# Patient Record
Sex: MALE | Race: ASIAN | NOT HISPANIC OR LATINO
[De-identification: names, ages, dates, MRNs, and addresses within clinical notes are randomized per-mention and may not be internally consistent; named-entity substitution may affect disease eponyms.]

---

## 2020-06-09 ENCOUNTER — APPOINTMENT (OUTPATIENT)
Dept: ORTHOPEDIC SURGERY | Facility: CLINIC | Age: 65
End: 2020-06-09
Payer: COMMERCIAL

## 2020-06-09 VITALS
HEART RATE: 80 BPM | WEIGHT: 170 LBS | TEMPERATURE: 98.1 F | SYSTOLIC BLOOD PRESSURE: 134 MMHG | HEIGHT: 69 IN | BODY MASS INDEX: 25.18 KG/M2 | DIASTOLIC BLOOD PRESSURE: 79 MMHG

## 2020-06-09 DIAGNOSIS — Z86.79 PERSONAL HISTORY OF OTHER DISEASES OF THE CIRCULATORY SYSTEM: ICD-10-CM

## 2020-06-09 DIAGNOSIS — M25.462 EFFUSION, LEFT KNEE: ICD-10-CM

## 2020-06-09 DIAGNOSIS — M17.12 UNILATERAL PRIMARY OSTEOARTHRITIS, LEFT KNEE: ICD-10-CM

## 2020-06-09 DIAGNOSIS — Z78.9 OTHER SPECIFIED HEALTH STATUS: ICD-10-CM

## 2020-06-09 DIAGNOSIS — Z86.39 PERSONAL HISTORY OF OTHER ENDOCRINE, NUTRITIONAL AND METABOLIC DISEASE: ICD-10-CM

## 2020-06-09 DIAGNOSIS — Z72.89 OTHER PROBLEMS RELATED TO LIFESTYLE: ICD-10-CM

## 2020-06-09 DIAGNOSIS — M25.562 PAIN IN LEFT KNEE: ICD-10-CM

## 2020-06-09 PROCEDURE — 73562 X-RAY EXAM OF KNEE 3: CPT | Mod: TC,LT

## 2020-06-09 PROCEDURE — 20610 DRAIN/INJ JOINT/BURSA W/O US: CPT | Mod: LT

## 2020-06-09 PROCEDURE — 99204 OFFICE O/P NEW MOD 45 MIN: CPT | Mod: 25

## 2020-06-09 NOTE — HISTORY OF PRESENT ILLNESS
[de-identified] : Raffi is a 65-year-old male who is complaining of acute onset of left knee pain. He states that he woke up with the knee pain and swelling approximately 2-3 days ago. He had no trauma to the knee. He has no other complaints. His pain scale is 6/10.

## 2020-06-09 NOTE — PROCEDURE
[de-identified] : Laterality: Left Knee\par \par The risks and benefits of the injection were reviewed with the patient today in office and all their questions were answered.  The injection site was the lateral aspect of the knee.  Prior to giving the injection the injection site was prepped with Chloraprep and a sterile field was created.  Sterile technique was carried out throughout the procedure.  After verbal consent from the patient we went ahead and injected the left knee today with 2 ml 40 mg Depo-Medrol, 4 ml 1% lidocaine and 4 ml of .5% Bupivacaine for a total of 10 ml with a 22 ronny 1.5" needle.  The medication was placed into the suprapatellar pouch without complication.  Post injection the patient reported no pain, had a normal gait and good motion of the knee.  The patient denied numbness and tingling going down their leg.  There were no complications during the procedure.

## 2020-06-09 NOTE — DISCUSSION/SUMMARY
[de-identified] : Assessment: Left Knee Osteoarthritis/Pain\par \par Plan:\par 1. RICE Therapy.\par 2. Antiinflammatories/Tylenol as needed for pain of discomfort. \par 3. The patient was advised to rest the knee for 24-48 hours post injection.  The patient is able to resume normal activities in 24-48 hours post injection if the knee feels ok.\par 4. Continue with a home exercise/stretching program. \par 5. All the patients questions were answered.  If the patient is experiencing any problems or has concerns they were advised to call the office or make an appointment to come in to be evaluated.\par \par

## 2020-06-09 NOTE — PHYSICAL EXAM
[de-identified] : Left Knee Physical Examination:\par \par General: Alert and oriented x3.  In no acute distress.  Pleasant in nature with a normal affect.  No apparent respiratory distress. \par \par Erythema, Warmth, Rubor: Negative\par Swelling/Edema: Mild to moderate knee effusion present.\par ROM: 0-140 degrees\par Katty's Test: Negative \par Medial Joint Line TTP: Positive\par Lateral Joint Line TTP: Negative\par Lachman Exam/Anterior Drawer/Pivot Shift Test: Negative \par MCL Pain: Negative\par LCL Pain: Negative\par Iliotibial Band Pain: Negative\par Patellofemoral Joint Pain: Positive\par Patellar Tendon TTP: Negative\par Pes Anserinus TTP: Negative\par Homans Sign: Negative\par Posterior Knee Pain: Negative\par Neuro: Intact with no sensory or motor deficits\par  [de-identified] : X-rays of the left knee taken in office today and reviewed with the patient showed: Osteoarthritis medial and patellofemoral compartments.

## 2020-12-18 ENCOUNTER — APPOINTMENT (OUTPATIENT)
Dept: NEUROSURGERY | Facility: CLINIC | Age: 65
End: 2020-12-18
Payer: COMMERCIAL

## 2020-12-18 VITALS
DIASTOLIC BLOOD PRESSURE: 102 MMHG | HEART RATE: 64 BPM | OXYGEN SATURATION: 100 % | BODY MASS INDEX: 25.48 KG/M2 | WEIGHT: 172 LBS | TEMPERATURE: 98.4 F | HEIGHT: 69 IN | SYSTOLIC BLOOD PRESSURE: 174 MMHG

## 2020-12-18 PROCEDURE — 99202 OFFICE O/P NEW SF 15 MIN: CPT

## 2020-12-18 PROCEDURE — 99072 ADDL SUPL MATRL&STAF TM PHE: CPT

## 2020-12-18 NOTE — CONSULT LETTER
[Dear  ___] : Dear  [unfilled], [Courtesy Letter:] : I had the pleasure of seeing your patient, [unfilled], in my office today. [Sincerely,] : Sincerely, [DrBret  ___] : Dr. WILLS [FreeTextEntry2] : Yong Herron MD\par [address], \par [place] [id number] [FreeTextEntry1] : Raffi June is a 65-year-old male who presents today lumbar symptoms.  Patient states symptoms started approximately 3 days ago with no specific event or trauma.  Patient has a history of L2-L3 disc herniations which was treated conservatively in 2010.  Patient reports lower back stiffness with a constant shooting pain to the posterior entire right leg.  The right leg shooting pains varies in intensity rated at a 8-9/10. He reports numbness wrapping around his right ankle.  He denies any bowel or bladder dysfunction.  He reports leg weakness when in pain.  He reports some difficulties with walking when in pain.  He is not tripping over his feet. Heat and Motrin provide him with slight relief. \par \par There is no imaging to review with the patient.\par \par Patient is alert and oriented.  No distress noted.  Positive right straight leg test.  Unable to elicit bilateral Achilles tendon reflexes.  Patellar reflexes present and equal.  Negative clonus.  Strength to lower extremities and feet 5/5.  Sensation to light touch to lower extremities equal and normal.  Patient is not having any noted difficulties with walking.\par \par I provided the patient with a Medrol Dosepak.  I also provided the patient with a prescription for an LSO brace when performing heavy lifting at work.  I have advised the patient to avoid any heavy lifting at this time.  I have also referred the patient to physical therapy.  Lastly, I provided the patient with a prescription for an x-ray of the lumbar spine.  We will follow-up in 6 weeks to review imaging and to evaluate for PT progression.  Patient is aware to call at anytime with any further questions or concerns or with any new or worsening symptoms. [FreeTextEntry3] : Kimberly Vela, MSN, FNP-BC\par Nurse Practitioner\par Department of Neurosurgery\par \par [facility] [place]\par [address],\[place] [address]\par \par Office: [phone]\par Fax: [phone]\par \par

## 2020-12-30 ENCOUNTER — EMERGENCY (EMERGENCY)
Facility: HOSPITAL | Age: 65
LOS: 1 days | Discharge: HOME | End: 2020-12-30
Attending: EMERGENCY MEDICINE | Admitting: EMERGENCY MEDICINE
Payer: COMMERCIAL

## 2020-12-30 VITALS
RESPIRATION RATE: 14 BRPM | TEMPERATURE: 98 F | DIASTOLIC BLOOD PRESSURE: 89 MMHG | HEART RATE: 60 BPM | OXYGEN SATURATION: 99 % | SYSTOLIC BLOOD PRESSURE: 180 MMHG | HEIGHT: 69 IN | WEIGHT: 175.05 LBS

## 2020-12-30 VITALS
SYSTOLIC BLOOD PRESSURE: 140 MMHG | OXYGEN SATURATION: 98 % | TEMPERATURE: 98 F | DIASTOLIC BLOOD PRESSURE: 80 MMHG | RESPIRATION RATE: 18 BRPM | HEART RATE: 77 BPM

## 2020-12-30 PROCEDURE — 96374 THER/PROPH/DIAG INJ IV PUSH: CPT

## 2020-12-30 PROCEDURE — 99283 EMERGENCY DEPT VISIT LOW MDM: CPT

## 2020-12-30 PROCEDURE — 99284 EMERGENCY DEPT VISIT MOD MDM: CPT | Mod: 25

## 2020-12-30 PROCEDURE — 96375 TX/PRO/DX INJ NEW DRUG ADDON: CPT

## 2020-12-30 RX ORDER — METHOCARBAMOL 500 MG/1
1 TABLET, FILM COATED ORAL
Qty: 15 | Refills: 0
Start: 2020-12-30 | End: 2021-01-03

## 2020-12-30 RX ORDER — KETOROLAC TROMETHAMINE 30 MG/ML
15 SYRINGE (ML) INJECTION ONCE
Refills: 0 | Status: DISCONTINUED | OUTPATIENT
Start: 2020-12-30 | End: 2020-12-30

## 2020-12-30 RX ORDER — IBUPROFEN 200 MG
1 TABLET ORAL
Qty: 30 | Refills: 0
Start: 2020-12-30 | End: 2021-01-23

## 2020-12-30 RX ORDER — HYDROMORPHONE HYDROCHLORIDE 2 MG/ML
0.5 INJECTION INTRAMUSCULAR; INTRAVENOUS; SUBCUTANEOUS ONCE
Refills: 0 | Status: DISCONTINUED | OUTPATIENT
Start: 2020-12-30 | End: 2020-12-30

## 2020-12-30 RX ORDER — CYCLOBENZAPRINE HYDROCHLORIDE 10 MG/1
10 TABLET, FILM COATED ORAL ONCE
Refills: 0 | Status: COMPLETED | OUTPATIENT
Start: 2020-12-30 | End: 2020-12-30

## 2020-12-30 RX ADMIN — Medication 15 MILLIGRAM(S): at 19:37

## 2020-12-30 RX ADMIN — Medication 125 MILLIGRAM(S): at 19:40

## 2020-12-30 RX ADMIN — HYDROMORPHONE HYDROCHLORIDE 0.5 MILLIGRAM(S): 2 INJECTION INTRAMUSCULAR; INTRAVENOUS; SUBCUTANEOUS at 19:34

## 2020-12-30 RX ADMIN — HYDROMORPHONE HYDROCHLORIDE 0.5 MILLIGRAM(S): 2 INJECTION INTRAMUSCULAR; INTRAVENOUS; SUBCUTANEOUS at 19:36

## 2020-12-30 RX ADMIN — CYCLOBENZAPRINE HYDROCHLORIDE 10 MILLIGRAM(S): 10 TABLET, FILM COATED ORAL at 19:34

## 2020-12-30 NOTE — ED PROVIDER NOTE - CARE PROVIDER_API CALL
Bao Islas  ANESTHESIOLOGY  [address]  [place] [address]  Phone: [phone]  Fax: [phone]  Follow Up Time: 1-3 Days

## 2020-12-30 NOTE — ED PROVIDER NOTE - OBJECTIVE STATEMENT
64 y/o male c/o 2-3 weeks of right sciatica-type pain. Pt states pain starts in right buttock, radiates down right leg. Pt reports parastases distal path and into toes. Pt saw Neurosurgeon x 2 weeks ago, was given Steroid pack and sent for physical therapy, is awaiting MRI. Pt has been going for physical therapy, has spoken to  about possible Epidural. Pt was waiting to make appointment last night, got home from work and pain had increased after getting into bed. Pt was unable to undress or go to bathroom due to pain with movement. Today, all day, was laying on ground as all movement caused a lot of pain. Pt took Motrin 400mg, last dose 4pm. Pt states he's able to move, although it hurts. Denies weakness.

## 2020-12-30 NOTE — ED PROVIDER NOTE - PATIENT PORTAL LINK FT
You can access the FollowMyHealth Patient Portal offered by [facility] by registering at the following website: [email or web address]. By joining [facility]’s FollowMyHealth portal, you will also be able to view your health information using other applications (apps) compatible with our system.

## 2020-12-30 NOTE — ED PROVIDER NOTE - NEUROLOGICAL, MLM
Alert and oriented, no focal deficits, no motor or sensory deficits. Alert and oriented, normal strength, reports change in sensation right toes

## 2020-12-30 NOTE — ED PROVIDER NOTE - MUSCULOSKELETAL, MLM
Spine appears normal, range of motion is not limited, no muscle or joint tenderness no spinal ttp, +ttp right mid buttock, good ROM right LE with slight hesitation

## 2020-12-30 NOTE — ED PROVIDER NOTE - NSFOLLOWUPINSTRUCTIONS_ED_ALL_ED_FT
Sciatica    WHAT YOU NEED TO KNOW:    Sciatica is a condition that causes pain along your sciatic nerve. The sciatic nerve runs from your spine through both sides of your buttocks. It then runs down the back of your thigh, into your lower leg and foot. Your sciatic nerve may be compressed, inflamed, irritated, or stretched.    DISCHARGE INSTRUCTIONS:    Medicines:   •NSAIDs: These medicines decrease swelling and pain. NSAIDs are available without a doctor's order. Ask your healthcare provider which medicine is right for you. Ask how much to take and when to take it. Take as directed. NSAIDs can cause stomach bleeding or kidney problems if not taken correctly.      •Acetaminophen: This medicine decreases pain. Acetaminophen is available without a doctor's order. Ask how much to take and when to take it. Follow directions. Acetaminophen can cause liver damage if not taken correctly.      •Muscle relaxers help decrease pain and muscle spasms.      •Take your medicine as directed. Contact your healthcare provider if you think your medicine is not helping or if you have side effects. Tell him of her if you are allergic to any medicine. Keep a list of the medicines, vitamins, and herbs you take. Include the amounts, and when and why you take them. Bring the list or the pill bottles to follow-up visits. Carry your medicine list with you in case of an emergency.      Follow up with your healthcare provider as directed: Write down your questions so you remember to ask them during your visits.     Manage your symptoms:   •Activity: Decrease your activity. Do not lift heavy objects or twist your back for at least 6 weeks. Slowly return to your usual activity.      •Ice: Ice helps decrease swelling and pain. Ice may also help prevent tissue damage. Use an ice pack, or put crushed ice in a plastic bag. Cover it with a towel and place it on your low back or leg for 15 to 20 minutes every hour or as directed.      •Heat: Heat helps decrease pain and muscle spasms. Apply heat on the area for 20 to 30 minutes every 2 hours for as many days as directed.       •Physical therapy: You may need to see physical therapist to teach you exercises to help improve movement and strength, and to decrease pain. An occupational therapist teaches you skills to help with your daily activities.       •Use assistive devices if directed: You may need to wear back support, such as a back brace. You may need crutches, a cane, or a walker to decrease stress on your lower back and leg muscles. Ask your healthcare provider for more information about assistive devices and how to use them correctly.      Self-care:   •Avoid pressure on your back and legs: Do not lift heavy objects, or stand or sit for long periods of time.      •Lift objects safely: Keep your back straight and bend your knees when you  an object. Do not bend or twist your back when you lift.      •Maintain a healthy weight: Ask your healthcare provider how much you should weigh. Ask him to help you create a weight loss plan if you are overweight.       •Exercise: Ask your healthcare provider about the best stretching, warmup, and exercise plan for you.       Contact your healthcare provider if:   •You have pain in your lower back at night or when resting.      •You have pain in your lower back with numbness below the knee.      •You have weakness in one leg only.      •You have questions or concerns about your condition or care.      Return to the emergency department if:   •You have trouble holding back your urine or bowel movements.      •You have weakness in both legs.      •You have numbness in your groin or buttocks.

## 2020-12-31 ENCOUNTER — NON-APPOINTMENT (OUTPATIENT)
Age: 65
End: 2020-12-31

## 2021-01-11 ENCOUNTER — NON-APPOINTMENT (OUTPATIENT)
Age: 66
End: 2021-01-11

## 2021-01-28 ENCOUNTER — APPOINTMENT (OUTPATIENT)
Dept: NEUROSURGERY | Facility: CLINIC | Age: 66
End: 2021-01-28
Payer: COMMERCIAL

## 2021-01-28 VITALS — BODY MASS INDEX: 25.09 KG/M2 | OXYGEN SATURATION: 99 % | WEIGHT: 169.38 LBS | HEIGHT: 69 IN

## 2021-01-28 DIAGNOSIS — M79.604 PAIN IN RIGHT LEG: ICD-10-CM

## 2021-01-28 DIAGNOSIS — R20.2 PARESTHESIA OF SKIN: ICD-10-CM

## 2021-01-28 PROBLEM — I10 ESSENTIAL (PRIMARY) HYPERTENSION: Chronic | Status: ACTIVE | Noted: 2020-12-30

## 2021-01-28 PROCEDURE — 99215 OFFICE O/P EST HI 40 MIN: CPT

## 2021-01-28 PROCEDURE — 99072 ADDL SUPL MATRL&STAF TM PHE: CPT

## 2021-01-28 NOTE — CONSULT LETTER
[Dear  ___] : Dear  [unfilled], [Courtesy Letter:] : I had the pleasure of seeing your patient, [unfilled], in my office today. [Sincerely,] : Sincerely, [FreeTextEntry2] : Yong Herron MD\par [address], \[place] [address] \par \par  [FreeTextEntry1] : Mr. June is a very pleasant 65-year-old male patient who was seen in our office today in follow-up after his MRI scan.\par \par Briefly, the patient had a severe episode of right-sided leg pain occurring mid December 2020 without trauma.  The patient attempted to manage his symptoms conservatively, but had a severe exacerbation of his pain requiring  presentation to the hospital on December 30, 2020.  At the time, the patient's pain was rated at a 12/10 in severity.  Thankfully, the patient's pain has resolved significantly and is currently rated at a 3/10 in severity. The patient, however, still has significant numbness in the right lower extremity which is his major concern today.\par \par On examination, the patient is alert, oriented, and compliant with the exam. The patient demonstrates 5/5 strength in the lower extremities bilaterally and 2+ reflexes at the patellar tendons.  The patient ambulates with a very mild antalgic gait.\par \par The patient is accompanied with an MRI scan of the lumbar spine dated January 26, 2021.  Unfortunately, there are several discrepancies in the radiology reports which may cause confusion.  Firstly, the patient has transitional lumbosacral junction anatomy noted on an x-ray performed on December 23, 2020.  Hence, the first fully formed disc is actually S1/2 and not L5/S1.  Using this nomenclature, the patient has a disc herniation at L5/S1 on the right.   in addition, my interpretation of his imaging would also include a small synovial cyst at L5/S1 on the right.  This, however, was not mentioned by the radiologist.  Moreover, the radiology report incorrectly labels this disc at L4/5.  In addition, the radiology report notes a "left lateral disc herniation at L3-4 contacting the left L3 nerve root" which would correspond to a far lateral disc at L4/5 with our current nomenclature.  However, I do not appreciate this finding at all on the images.  The patient does have a far lateral disc at L4/5 on the right which is downwardly displaced and thus would not be contacting the L4 nerve root.\par \par Taken together, the patient has a clinical history and radiographic findings most consistent with a resolving disc herniation and lumbar radiculopathy.  Again, the patient has a lumbarized sacrum meaning that the disc herniation and possible synovial cyst by this convention would be located at L5/S1 on the right.  I will reach out to the radiologist to discuss this case and have them amend their report if necessary.  Regardless of the nomenclature at this time, the patient would like to continue with conservative therapy for the next several weeks to see if his disc herniation/synovial cyst and radiculopathy will resolve without surgical intervention.  We did take some time to discuss the possibility of a microdiscectomy and resection of synovial cyst should his symptoms not subside spontaneously.  At this time, the patient has scheduled follow-up with us in approximately 6 weeks and we look forward to seeing him back then or earlier should the need arise. [FreeTextEntry3] : Anderson Wilson MD, PhD, FRCPSC \par Attending Neurosurgeon \par [facility] \par [address] \[place] [address] \par Office: [phone] \par Fax: [phone]\par \par

## 2021-03-11 ENCOUNTER — APPOINTMENT (OUTPATIENT)
Dept: NEUROSURGERY | Facility: CLINIC | Age: 66
End: 2021-03-11
Payer: COMMERCIAL

## 2021-03-11 VITALS
HEART RATE: 73 BPM | HEIGHT: 69 IN | OXYGEN SATURATION: 100 % | WEIGHT: 170 LBS | BODY MASS INDEX: 25.18 KG/M2 | TEMPERATURE: 97.8 F | SYSTOLIC BLOOD PRESSURE: 158 MMHG | DIASTOLIC BLOOD PRESSURE: 91 MMHG

## 2021-03-11 PROCEDURE — 99072 ADDL SUPL MATRL&STAF TM PHE: CPT

## 2021-03-11 PROCEDURE — 99214 OFFICE O/P EST MOD 30 MIN: CPT

## 2021-03-11 NOTE — CONSULT LETTER
[Dear  ___] : Dear  [unfilled], [Courtesy Letter:] : I had the pleasure of seeing your patient, [unfilled], in my office today. [Sincerely,] : Sincerely, [FreeTextEntry2] : Yong Herron MD\par [address], \par [place] [id number]  [FreeTextEntry1] : Mr. June is a very pleasant 66-year-old male patient who was seen in our office today in regards to right-sided leg symptoms.\par \par As you may recall, the patient had a severe onset of leg pain in the middle of December 2020 culminating in presentation to the hospital for pain management.  The patient subsequently underwent advanced imaging and was noted to have an L4/5 disc herniation on the right with nerve root compression.  Currently, the patient continues to have numbness on the lateral aspect of his  right foot which is his major concern.  The patient states that he occasionally has pain in the right leg but he has not required any pain medications for the last month and a half.   although the patient's pain is significantly improved since the onset, the patient believes that his numbness has remained stable.  The patient denies any weakness, saddle anesthesia, or new bowel/bladder symptoms.  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  The patient demonstrates 5/5 strength in the lower extremities bilaterally with hip flexion, knee extension, ankle dorsiflexion, ankle plantar flexion, and extension of the hallucis longus.  The patient ambulates well.  The patient does have numbness on the  lateral aspect of the right foot and calf.\par \par  I have no new imaging to review today.  The patient's previous imaging from January 26, 2021 demonstrates a right-sided L4/5 disc herniation with the possibility of a synovial cyst as well.\par \par Taken together, the patient has a clinical history of radiographic findings most consistent with a resolving lumbar radiculopathy secondary to a disc herniation.  At this time, I explained to the patient that he would be a surgical candidate for a microdiscectomy and synovial cyst resection on the right at L4/5 should conservative therapies fail.  However, the patient is currently doing well clinically and thus we both agreed that surgery is premature.  I am hopeful that the patient's symptoms will resolve completely with time, however our office remains open to the patient at any time should he need our help.  The patient will be following up with us on an as-needed basis at his request. [FreeTextEntry3] : Anderson Wilson MD, PhD, FRCSC, FAANS\par Attending Neurosurgeon\par  of Neurosurgery\par [facility]\par [facility] [place]\par [address],\[place] [address]\par Office: [phone]\par Fax: [phone]\par \par  \par \par \par

## 2022-01-01 ENCOUNTER — NON-APPOINTMENT (OUTPATIENT)
Age: 67
End: 2022-01-01

## 2022-01-01 DIAGNOSIS — R39.9 UNSPECIFIED SYMPTOMS AND SIGNS INVOLVING THE GENITOURINARY SYSTEM: ICD-10-CM

## 2022-01-04 ENCOUNTER — APPOINTMENT (OUTPATIENT)
Dept: ULTRASOUND IMAGING | Facility: CLINIC | Age: 67
End: 2022-01-04
Payer: COMMERCIAL

## 2022-01-04 ENCOUNTER — OUTPATIENT (OUTPATIENT)
Dept: OUTPATIENT SERVICES | Facility: HOSPITAL | Age: 67
LOS: 1 days | End: 2022-01-04
Payer: COMMERCIAL

## 2022-01-04 DIAGNOSIS — R39.9 UNSPECIFIED SYMPTOMS AND SIGNS INVOLVING THE GENITOURINARY SYSTEM: ICD-10-CM

## 2022-01-04 LAB
APPEARANCE: CLEAR
BACTERIA UR CULT: NORMAL
BACTERIA: NEGATIVE
BILIRUBIN URINE: NEGATIVE
BLOOD URINE: NEGATIVE
COLOR: YELLOW
GLUCOSE QUALITATIVE U: NEGATIVE
HYALINE CASTS: 0 /LPF
KETONES URINE: NEGATIVE
LEUKOCYTE ESTERASE URINE: NEGATIVE
MICROSCOPIC-UA: NORMAL
NITRITE URINE: NEGATIVE
PH URINE: 6
PROTEIN URINE: NORMAL
RED BLOOD CELLS URINE: 1 /HPF
SPECIFIC GRAVITY URINE: 1.02
SQUAMOUS EPITHELIAL CELLS: 0 /HPF
UROBILINOGEN URINE: NORMAL
WHITE BLOOD CELLS URINE: 1 /HPF

## 2022-01-04 PROCEDURE — 76770 US EXAM ABDO BACK WALL COMP: CPT

## 2022-01-04 PROCEDURE — 76770 US EXAM ABDO BACK WALL COMP: CPT | Mod: 26

## 2022-03-08 ENCOUNTER — APPOINTMENT (OUTPATIENT)
Dept: NEUROSURGERY | Facility: CLINIC | Age: 67
End: 2022-03-08
Payer: COMMERCIAL

## 2022-03-08 VITALS
HEART RATE: 76 BPM | SYSTOLIC BLOOD PRESSURE: 173 MMHG | WEIGHT: 178 LBS | BODY MASS INDEX: 26.36 KG/M2 | DIASTOLIC BLOOD PRESSURE: 90 MMHG | HEIGHT: 69 IN | OXYGEN SATURATION: 99 % | TEMPERATURE: 97.8 F

## 2022-03-08 PROCEDURE — 99214 OFFICE O/P EST MOD 30 MIN: CPT

## 2022-03-08 NOTE — CONSULT LETTER
[Dear  ___] : Dear  [unfilled], [Courtesy Letter:] : I had the pleasure of seeing your patient, [unfilled], in my office today. [Sincerely,] : Sincerely, [FreeTextEntry2] : Yong Herron MD\par [address], \[place] [address] \par  [FreeTextEntry1] : Mr. June is a very pleasant 67-year-old male patient who was seen in our office today in follow-up.  The patient previously had severe right-sided pain secondary to an L4/5 disc herniation and simultaneous synovial cyst.\par \par I am happy to report that the patient is currently doing well following conservative therapy alone.  At this time, the patient complains primarily of  back stiffness and numbness in the right foot from the calf down.  The patient does not complain of pain at this time.\par \par On examination, the patient is alert, oriented, and compliant with the exam.  The patient demonstrates 5/5 strength in the lower extremities bilaterally with hip flexion, knee extension, ankle dorsiflexion, ankle plantar flexion and extension of the hallucis longus.  The patient ambulates well.  The patient continues to have numbness in the lateral aspect of the right foot and the calf.\par \par I have no new imaging to review today.\par \par Taken together, the patient has done well with conservative therapy but continues to have neurologic deficits including numbness in the right foot.  At this time, I have recommended an updated MRI scan to reevaluate the state of the disc herniation and synovial cyst.  With regards to his low back pain, the patient most likely has a component of muscle disuse in the lower back for which I recommended physical therapy.  I will be in contact with the patient with the results of his MRI scan once they become available to provide a more specific treatment plan going forward. [FreeTextEntry3] : Anderson Wilson MD, PhD, FRCPSC \par Attending Neurosurgeon \par [facility] \par [address] \[place] [address] \par Office: [phone] \par Fax: [phone]\par \par

## 2022-03-24 ENCOUNTER — NON-APPOINTMENT (OUTPATIENT)
Age: 67
End: 2022-03-24

## 2022-04-12 ENCOUNTER — APPOINTMENT (OUTPATIENT)
Dept: SURGERY | Facility: CLINIC | Age: 67
End: 2022-04-12

## 2022-06-07 ENCOUNTER — NON-APPOINTMENT (OUTPATIENT)
Age: 67
End: 2022-06-07

## 2022-06-07 ENCOUNTER — APPOINTMENT (OUTPATIENT)
Dept: SURGERY | Facility: CLINIC | Age: 67
End: 2022-06-07
Payer: MEDICARE

## 2022-06-07 VITALS
HEART RATE: 78 BPM | SYSTOLIC BLOOD PRESSURE: 191 MMHG | TEMPERATURE: 97.3 F | WEIGHT: 180 LBS | HEIGHT: 69 IN | OXYGEN SATURATION: 99 % | BODY MASS INDEX: 26.66 KG/M2 | DIASTOLIC BLOOD PRESSURE: 91 MMHG

## 2022-06-07 DIAGNOSIS — Z84.1 FAMILY HISTORY OF DISORDERS OF KIDNEY AND URETER: ICD-10-CM

## 2022-06-07 PROCEDURE — 99204 OFFICE O/P NEW MOD 45 MIN: CPT

## 2022-06-07 NOTE — PHYSICAL EXAM
[JVD] : no jugular venous distention  [Normal Thyroid] : the thyroid was normal [Carotid Bruits] : no carotid bruits [Normal Breath Sounds] : Normal breath sounds [Normal Heart Sounds] : normal heart sounds [Normal Rate and Rhythm] : normal rate and rhythm [No Rash or Lesion] : No rash or lesion [Alert] : alert [Oriented to Person] : oriented to person [Oriented to Place] : oriented to place [Oriented to Time] : oriented to time [Calm] : calm [de-identified] : well developed male in no distress [de-identified] : noninjected and nonicteric [de-identified] : without adenopathy [de-identified] : normal bowel sounds, without distension or tenderness [de-identified] : normal testicles, without masses. Bilateral reducible inguinal hernias [de-identified] : done by LUCIANO [de-identified] : without calf pain or swelling

## 2022-06-07 NOTE — HISTORY OF PRESENT ILLNESS
[de-identified] : bilateral inguinal hernias [de-identified] : 67 year old male who presents with a long history of inguinal hernias that have been increasing in size and are now causing him discomfort. He denies any nausea or vomiting or changes in his bowel habits. He does have swelling in both groins. He denies urinary symptoms.

## 2022-06-07 NOTE — ASSESSMENT
[FreeTextEntry1] : I have discussed the signs and symptoms of incarceration and strangulation with pt and the importance of calling immediately should they develop.\par I have also discussed the risks, benefits and surgical options with the patient. He wants to schedule a laparoscopic repair of his bilateral inguinal hernias.

## 2022-08-01 ENCOUNTER — NON-APPOINTMENT (OUTPATIENT)
Age: 67
End: 2022-08-01

## 2022-08-01 ENCOUNTER — APPOINTMENT (OUTPATIENT)
Dept: INTERNAL MEDICINE | Facility: CLINIC | Age: 67
End: 2022-08-01

## 2022-08-01 VITALS
HEIGHT: 69 IN | SYSTOLIC BLOOD PRESSURE: 152 MMHG | BODY MASS INDEX: 26.22 KG/M2 | WEIGHT: 177 LBS | TEMPERATURE: 97.9 F | RESPIRATION RATE: 16 BRPM | DIASTOLIC BLOOD PRESSURE: 86 MMHG | HEART RATE: 77 BPM | OXYGEN SATURATION: 95 %

## 2022-08-01 VITALS — DIASTOLIC BLOOD PRESSURE: 90 MMHG | SYSTOLIC BLOOD PRESSURE: 146 MMHG

## 2022-08-01 DIAGNOSIS — F17.200 NICOTINE DEPENDENCE, UNSPECIFIED, UNCOMPLICATED: ICD-10-CM

## 2022-08-01 DIAGNOSIS — Z82.49 FAMILY HISTORY OF ISCHEMIC HEART DISEASE AND OTHER DISEASES OF THE CIRCULATORY SYSTEM: ICD-10-CM

## 2022-08-01 PROCEDURE — G0402 INITIAL PREVENTIVE EXAM: CPT

## 2022-08-01 PROCEDURE — G0403: CPT

## 2022-08-01 RX ORDER — VALSARTAN 40 MG/1
TABLET, COATED ORAL
Refills: 0 | Status: DISCONTINUED | COMMUNITY
End: 2022-08-01

## 2022-08-01 RX ORDER — MELOXICAM 15 MG/1
15 TABLET ORAL DAILY
Qty: 30 | Refills: 1 | Status: DISCONTINUED | COMMUNITY
Start: 2020-06-09 | End: 2022-08-01

## 2022-08-01 RX ORDER — METHYLPREDNISOLONE 4 MG/1
4 TABLET ORAL
Qty: 1 | Refills: 0 | Status: DISCONTINUED | COMMUNITY
Start: 2020-12-18 | End: 2022-08-01

## 2022-08-01 RX ORDER — GABAPENTIN 100 MG/1
100 CAPSULE ORAL 3 TIMES DAILY
Qty: 30 | Refills: 2 | Status: DISCONTINUED | COMMUNITY
Start: 2020-12-31 | End: 2022-08-01

## 2022-08-01 NOTE — END OF VISIT
[FreeTextEntry3] : "I, Celine Hu, personally scribed the services dictated to me by Dr. Willi Marshall MD in this documentation on 08/01/2022 " \par \par "I Dr. Willi Marshall MD, personally performed the services described in this documentation on 08/01/2022 for the patient as scribed by Celine Hu in my presence. I have reviewed and verified that all the information is accurate and true."

## 2022-08-01 NOTE — HISTORY OF PRESENT ILLNESS
[FreeTextEntry1] : new patient, establishing care  [de-identified] : JORDAN OLIVERA is a 67 year old M who presents today for new patient, establishing care. Patient has a Hx of high blood pressure and high cholesterol. He reports having herniated discs with numbness associated in feet. He suspects a bilateral hernia. Patient also states it has been a couple years since his last colonoscopy.

## 2022-08-01 NOTE — HEALTH RISK ASSESSMENT
[Good] : ~his/her~  mood as  good [Never] : Never [No] : In the past 12 months have you used drugs other than those required for medical reasons? No [No falls in past year] : Patient reported no falls in the past year [0] : 2) Feeling down, depressed, or hopeless: Not at all (0) [PHQ-2 Negative - No further assessment needed] : PHQ-2 Negative - No further assessment needed [[id number]] : 0

## 2022-08-01 NOTE — PLAN
[FreeTextEntry1] : further instructions pending lab results \par adjust blood pressure medication add Amlodipine \par follow up with surgeon\par obtain old records\par recommend Colonoscopy \par return 2 weeks

## 2022-08-01 NOTE — PHYSICAL EXAM
[No Acute Distress] : no acute distress [Well Nourished] : well nourished [Well Developed] : well developed [Well-Appearing] : well-appearing [Normal Sclera/Conjunctiva] : normal sclera/conjunctiva [PERRL] : pupils equal round and reactive to light [EOMI] : extraocular movements intact [Normal Outer Ear/Nose] : the outer ears and nose were normal in appearance [Normal Oropharynx] : the oropharynx was normal [Normal TMs] : both tympanic membranes were normal [No JVD] : no jugular venous distention [No Lymphadenopathy] : no lymphadenopathy [Supple] : supple [Thyroid Normal, No Nodules] : the thyroid was normal and there were no nodules present [No Respiratory Distress] : no respiratory distress  [No Accessory Muscle Use] : no accessory muscle use [Clear to Auscultation] : lungs were clear to auscultation bilaterally [Normal Rate] : normal rate  [Regular Rhythm] : with a regular rhythm [Normal S1, S2] : normal S1 and S2 [No Murmur] : no murmur heard [No Carotid Bruits] : no carotid bruits [No Abdominal Bruit] : a ~M bruit was not heard ~T in the abdomen [No Varicosities] : no varicosities [Pedal Pulses Present] : the pedal pulses are present [No Edema] : there was no peripheral edema [No Palpable Aorta] : no palpable aorta [No Extremity Clubbing/Cyanosis] : no extremity clubbing/cyanosis [Soft] : abdomen soft [Non Tender] : non-tender [Non-distended] : non-distended [No Masses] : no abdominal mass palpated [No HSM] : no HSM [Normal Bowel Sounds] : normal bowel sounds [Normal Supraclavicular Nodes] : no supraclavicular lymphadenopathy [Normal Posterior Cervical Nodes] : no posterior cervical lymphadenopathy [Normal Anterior Cervical Nodes] : no anterior cervical lymphadenopathy [No CVA Tenderness] : no CVA  tenderness [No Spinal Tenderness] : no spinal tenderness [No Joint Swelling] : no joint swelling [Grossly Normal Strength/Tone] : grossly normal strength/tone [No Rash] : no rash [Coordination Grossly Intact] : coordination grossly intact [No Focal Deficits] : no focal deficits [Normal Gait] : normal gait [Deep Tendon Reflexes (DTR)] : deep tendon reflexes were 2+ and symmetric [Speech Grossly Normal] : speech grossly normal [Memory Grossly Normal] : memory grossly normal [Normal Affect] : the affect was normal [Alert and Oriented x3] : oriented to person, place, and time [Normal Mood] : the mood was normal [Normal Insight/Judgement] : insight and judgment were intact [Normal Nasal Mucosa] : the nasal mucosa was normal [Normal Appearance] : normal in appearance [Inguinal Hernia Right] : a right inguinal hernia was present [Inguinal Hernia Left] : a left inguinal hernia was present [] : which was reducible [Normal Sphincter Tone] : normal sphincter tone [No Mass] : no mass [Normal Axillary Nodes] : no axillary lymphadenopathy [Normal Inguinal Nodes] : no inguinal lymphadenopathy [Normal Femoral Nodes] : no femoral lymphadenopathy

## 2022-08-02 DIAGNOSIS — E55.9 VITAMIN D DEFICIENCY, UNSPECIFIED: ICD-10-CM

## 2022-08-02 LAB
25(OH)D3 SERPL-MCNC: 24.7 NG/ML
ALBUMIN SERPL ELPH-MCNC: 4.7 G/DL
ALP BLD-CCNC: 93 U/L
ALT SERPL-CCNC: 22 U/L
ANION GAP SERPL CALC-SCNC: 13 MMOL/L
APPEARANCE: CLEAR
AST SERPL-CCNC: 25 U/L
BACTERIA: NEGATIVE
BASOPHILS # BLD AUTO: 0.04 K/UL
BASOPHILS NFR BLD AUTO: 1 %
BILIRUB SERPL-MCNC: 0.5 MG/DL
BILIRUBIN URINE: NEGATIVE
BLOOD URINE: NEGATIVE
BUN SERPL-MCNC: 20 MG/DL
CALCIUM SERPL-MCNC: 9.9 MG/DL
CHLORIDE SERPL-SCNC: 102 MMOL/L
CHOLEST SERPL-MCNC: 174 MG/DL
CK SERPL-CCNC: 124 U/L
CO2 SERPL-SCNC: 28 MMOL/L
COLOR: NORMAL
CREAT SERPL-MCNC: 1.07 MG/DL
EGFR: 77 ML/MIN/1.73M2
EOSINOPHIL # BLD AUTO: 0.1 K/UL
EOSINOPHIL NFR BLD AUTO: 2.5 %
ESTIMATED AVERAGE GLUCOSE: 108 MG/DL
GLUCOSE QUALITATIVE U: NEGATIVE
GLUCOSE SERPL-MCNC: 100 MG/DL
HBA1C MFR BLD HPLC: 5.4 %
HCT VFR BLD CALC: 41.3 %
HDLC SERPL-MCNC: 60 MG/DL
HEMOCCULT STL QL IA: NEGATIVE
HGB BLD-MCNC: 13.7 G/DL
HYALINE CASTS: 0 /LPF
IMM GRANULOCYTES NFR BLD AUTO: 0.2 %
KETONES URINE: NEGATIVE
LDLC SERPL CALC-MCNC: 102 MG/DL
LEUKOCYTE ESTERASE URINE: NEGATIVE
LYMPHOCYTES # BLD AUTO: 1.4 K/UL
LYMPHOCYTES NFR BLD AUTO: 34.9 %
MAN DIFF?: NORMAL
MCHC RBC-ENTMCNC: 30.1 PG
MCHC RBC-ENTMCNC: 33.2 GM/DL
MCV RBC AUTO: 90.8 FL
MICROSCOPIC-UA: NORMAL
MONOCYTES # BLD AUTO: 0.42 K/UL
MONOCYTES NFR BLD AUTO: 10.5 %
NEUTROPHILS # BLD AUTO: 2.04 K/UL
NEUTROPHILS NFR BLD AUTO: 50.9 %
NITRITE URINE: NEGATIVE
NONHDLC SERPL-MCNC: 114 MG/DL
PH URINE: 6.5
PLATELET # BLD AUTO: 213 K/UL
POTASSIUM SERPL-SCNC: 4.3 MMOL/L
PROT SERPL-MCNC: 7.3 G/DL
PROTEIN URINE: NEGATIVE
PSA SERPL-MCNC: 9.16 NG/ML
RBC # BLD: 4.55 M/UL
RBC # FLD: 13.7 %
RED BLOOD CELLS URINE: 1 /HPF
SODIUM SERPL-SCNC: 143 MMOL/L
SPECIFIC GRAVITY URINE: 1.02
SQUAMOUS EPITHELIAL CELLS: 0 /HPF
TRIGL SERPL-MCNC: 59 MG/DL
TSH SERPL-ACNC: 2.72 UIU/ML
UROBILINOGEN URINE: NORMAL
WBC # FLD AUTO: 4.01 K/UL
WHITE BLOOD CELLS URINE: 1 /HPF

## 2022-08-03 ENCOUNTER — NON-APPOINTMENT (OUTPATIENT)
Age: 67
End: 2022-08-03

## 2022-08-11 ENCOUNTER — TRANSCRIPTION ENCOUNTER (OUTPATIENT)
Age: 67
End: 2022-08-11

## 2022-08-11 ENCOUNTER — NON-APPOINTMENT (OUTPATIENT)
Age: 67
End: 2022-08-11

## 2022-08-12 ENCOUNTER — NON-APPOINTMENT (OUTPATIENT)
Age: 67
End: 2022-08-12

## 2022-08-12 LAB
PSA FREE FLD-MCNC: 8 %
PSA FREE SERPL-MCNC: 0.4 NG/ML
PSA SERPL-MCNC: 4.94 NG/ML

## 2022-08-17 ENCOUNTER — APPOINTMENT (OUTPATIENT)
Dept: UROLOGY | Facility: CLINIC | Age: 67
End: 2022-08-17

## 2022-08-17 VITALS
HEART RATE: 72 BPM | HEIGHT: 69 IN | SYSTOLIC BLOOD PRESSURE: 165 MMHG | DIASTOLIC BLOOD PRESSURE: 93 MMHG | WEIGHT: 177 LBS | OXYGEN SATURATION: 98 % | BODY MASS INDEX: 26.22 KG/M2

## 2022-08-17 DIAGNOSIS — R97.20 ELEVATED PROSTATE, SPECIFIC ANTIGEN [PSA]: ICD-10-CM

## 2022-08-17 PROCEDURE — 99204 OFFICE O/P NEW MOD 45 MIN: CPT | Mod: 25

## 2022-08-17 PROCEDURE — 51798 US URINE CAPACITY MEASURE: CPT

## 2022-08-17 NOTE — HISTORY OF PRESENT ILLNESS
[FreeTextEntry1] : 67 year old male presents for Elevated PSA. \par Recently had PSA checked and was told is elevated. Spoke with Patient on phone and repeated PSA. \par Denies any recent unintentional weight loss, night sweats and new bone pain. Has chronic back pain.\par No family history of Prostate cancer. \par Reports reasonable stream, urinates every 3-4 hours or so during the day. Nocturia of 1 x. \par Endorses off and on dysuria, urgency, sense of incomplete emptying and post void dribbling. \par Denies hematuria, lower abdominal or flank pain, fever, chills or rigors.\par Rates erections 3-4/5. Able to attain, maintain and penetrate. \par Normal libido and ejaculation. \par \par Has complained of numbness, pinched feeling in pelvic area and some dysuria in January 2022. \par Had obtained urine test and Renal and Bladder Ultrasound: negative. \par \par

## 2022-08-17 NOTE — ASSESSMENT
[FreeTextEntry1] : Per Patient PSA: 0.92(Jan 2022). \par \par Elevated PSA:\par Discussed with the patient that PSA is a substance produced by the prostate gland. Elevated PSA levels may indicate a noncancerous condition such as prostatitis, or an enlarged prostate but it may also indicate prostate cancer.\par Discussed PSA screening and latest recommendations/guidelines- USPTF and AUA. \par Explained that only way to rule out Prostate cancer in a patient with elevated PSA, increased PSA velocity or abnormal digital rectal exam is to do Prostate needle biopsy.\par Total and Free PSA 1 week before next appointment.\par \par Benign Prostatic Hyperplasia:\par PVR: 0 ml. \par Will get Urinalysis and Urine culture.\par Discussed treatment options. Recommended Flomax. Patient agreeable. \par Prescribed Flomax. Explained common side effects: nasal congestion, cough, muscle pain, back pain, dizziness, orthostatic hypotension, somnolence and retrograde ejaculation. \par \par Return to office in 2 months or sooner if any issues: will do Uroflo/PVR.

## 2022-08-17 NOTE — PHYSICAL EXAM
[Urethral Meatus] : meatus normal [Penis Abnormality] : normal uncircumcised penis [Epididymis] : the epididymides were normal [Testes Tenderness] : no tenderness of the testes [Testes Mass (___cm)] : there were no testicular masses [Prostate Tenderness] : the prostate was not tender [No Prostate Nodules] : no prostate nodules [Prostate Size ___ (0-4)] : prostate size [unfilled] (scale: 0-4) [Normal Appearance] : normal appearance [General Appearance - In No Acute Distress] : no acute distress [FreeTextEntry1] : normal peripheral circulation  [] : no respiratory distress [Abdomen Soft] : soft [Abdomen Tenderness] : non-tender [Normal Station and Gait] : the gait and station were normal for the patient's age [Skin Color & Pigmentation] : normal skin color and pigmentation [No Focal Deficits] : no focal deficits [Oriented To Time, Place, And Person] : oriented to person, place, and time [No Palpable Adenopathy] : no palpable adenopathy

## 2022-08-18 LAB
APPEARANCE: CLEAR
BACTERIA: NEGATIVE
BILIRUBIN URINE: NEGATIVE
BLOOD URINE: NEGATIVE
COLOR: NORMAL
GLUCOSE QUALITATIVE U: NEGATIVE
HYALINE CASTS: 0 /LPF
KETONES URINE: NEGATIVE
LEUKOCYTE ESTERASE URINE: NEGATIVE
MICROSCOPIC-UA: NORMAL
NITRITE URINE: NEGATIVE
PH URINE: 6.5
PROTEIN URINE: NORMAL
RED BLOOD CELLS URINE: 0 /HPF
SPECIFIC GRAVITY URINE: 1.02
SQUAMOUS EPITHELIAL CELLS: 0 /HPF
UROBILINOGEN URINE: NORMAL
WHITE BLOOD CELLS URINE: 0 /HPF

## 2022-08-19 LAB — BACTERIA UR CULT: NORMAL

## 2022-08-30 ENCOUNTER — APPOINTMENT (OUTPATIENT)
Dept: INTERNAL MEDICINE | Facility: CLINIC | Age: 67
End: 2022-08-30

## 2022-08-30 VITALS
WEIGHT: 176 LBS | DIASTOLIC BLOOD PRESSURE: 86 MMHG | BODY MASS INDEX: 26.07 KG/M2 | RESPIRATION RATE: 16 BRPM | TEMPERATURE: 98.1 F | HEIGHT: 69 IN | SYSTOLIC BLOOD PRESSURE: 132 MMHG | HEART RATE: 71 BPM | OXYGEN SATURATION: 97 %

## 2022-08-30 VITALS — DIASTOLIC BLOOD PRESSURE: 80 MMHG | SYSTOLIC BLOOD PRESSURE: 134 MMHG

## 2022-08-30 PROCEDURE — 99214 OFFICE O/P EST MOD 30 MIN: CPT

## 2022-08-30 NOTE — HISTORY OF PRESENT ILLNESS
[FreeTextEntry1] : follow up  [de-identified] : JORDAN JOSELIN is a 67 year old M who presents today for follow up for blood pressure and cholesterol\par tolerating medications

## 2022-08-30 NOTE — END OF VISIT
[FreeTextEntry3] : "I, Marylou Browne, personally scribed the services dictated to me by Dr. Willi Marshall MD in this documentation on 08/30/2022 " \par \par "I Dr. Willi Marshall MD, personally performed the services described in this documentation on 08/30/2022 for the patient as scribed by Marylou Browne in my presence. I have reviewed and verified that all the information is accurate and true."

## 2022-08-30 NOTE — HEALTH RISK ASSESSMENT
[Never] : Never [Yes] : Yes [2 - 3 times a week (3 pts)] : 2 - 3  times a week (3 points) [1 or 2 (0 pts)] : 1 or 2 (0 points) [Never (0 pts)] : Never (0 points) [No] : In the past 12 months have you used drugs other than those required for medical reasons? No [No falls in past year] : Patient reported no falls in the past year [0] : 2) Feeling down, depressed, or hopeless: Not at all (0) [PHQ-2 Negative - No further assessment needed] : PHQ-2 Negative - No further assessment needed [[id number]] : 0

## 2022-10-07 LAB
PSA FREE FLD-MCNC: 18 %
PSA FREE SERPL-MCNC: 0.22 NG/ML
PSA SERPL-MCNC: 1.2 NG/ML

## 2022-11-30 ENCOUNTER — RX RENEWAL (OUTPATIENT)
Age: 67
End: 2022-11-30

## 2022-12-08 ENCOUNTER — APPOINTMENT (OUTPATIENT)
Dept: NEUROSURGERY | Facility: CLINIC | Age: 67
End: 2022-12-08

## 2022-12-08 VITALS
HEART RATE: 74 BPM | WEIGHT: 176.2 LBS | SYSTOLIC BLOOD PRESSURE: 174 MMHG | OXYGEN SATURATION: 100 % | DIASTOLIC BLOOD PRESSURE: 91 MMHG | HEIGHT: 69 IN | TEMPERATURE: 97.1 F | BODY MASS INDEX: 26.1 KG/M2

## 2022-12-08 DIAGNOSIS — M54.9 DORSALGIA, UNSPECIFIED: ICD-10-CM

## 2022-12-08 DIAGNOSIS — G89.29 DORSALGIA, UNSPECIFIED: ICD-10-CM

## 2022-12-08 PROCEDURE — 99215 OFFICE O/P EST HI 40 MIN: CPT

## 2022-12-23 ENCOUNTER — APPOINTMENT (OUTPATIENT)
Dept: NEUROLOGY | Facility: CLINIC | Age: 67
End: 2022-12-23

## 2022-12-23 PROCEDURE — 99203 OFFICE O/P NEW LOW 30 MIN: CPT

## 2022-12-23 PROCEDURE — 95910 NRV CNDJ TEST 7-8 STUDIES: CPT

## 2022-12-23 PROCEDURE — 95886 MUSC TEST DONE W/N TEST COMP: CPT

## 2022-12-23 NOTE — DATA REVIEWED
[de-identified] : MRI of the lumbosacral spine dated December 14, 2022 impression: Transitional lumbosacral anatomy redemonstrated with multilevel spondylosis and facet arthrosis most pronounced at L5-S1 where disc material abuts but does not displace the descending S1 nerve roots.

## 2022-12-23 NOTE — HISTORY OF PRESENT ILLNESS
[FreeTextEntry1] : 67-year-old man right-handed with a history of chronic back pain, times radiating down the legs, more on the right than the left.  Complaining now of numbness of the feet, especially on the right below the knee.\par No weakness, no change in bowel bladder function.\par Recent MRI of the lumbosacral spine, demonstrated multilevel spondylosis, especially at the L5-S1.\par

## 2022-12-23 NOTE — PHYSICAL EXAM
[General Appearance - Alert] : alert [General Appearance - In No Acute Distress] : in no acute distress [Oriented To Time, Place, And Person] : oriented to person, place, and time [Cranial Nerves Optic (II)] : visual acuity intact bilaterally,  visual fields full to confrontation, pupils equal round and reactive to light [Cranial Nerves Oculomotor (III)] : extraocular motion intact [Cranial Nerves Facial (VII)] : face symmetrical [Cranial Nerves Vestibulocochlear (VIII)] : hearing was intact bilaterally [Cranial Nerves Accessory (XI - Cranial And Spinal)] : head turning and shoulder shrug symmetric [Motor Tone] : muscle tone was normal in all four extremities [Involuntary Movements] : no involuntary movements were seen [Motor Handedness Right-Handed] : the patient is right hand dominant [Motor Strength Hips Right Weakness] : normal hip strength [Motor Strength Knee Right Weakness] : normal knee strength [Motor Strength Ankle Right Weakness] : normal ankle strength [Motor Strength Toes Right Foot Weakness] : toe weakness present [Motor Strength Hips Left Weakness] : normal hip strength [Motor Strength Knee Left Weakness] : normal knee strength [Motor Strength Ankle Left Weakness] : normal ankle strength [Motor Strength Toes Left Foot Weakness] : normal toe strength [Pain / Temp Decrease Lateral Upper Thigh - Right Only] : diminished right lateral upper thigh [Pain / Temp Decrease Lateral Upper Thigh - Left Only] : normal left lateral upper thigh [Pain / Temp Decrease Lower Medial Thigh & Knee Right Only] : normal right lower medial thigh and knee [Pain / Temp Decrease Lower Medial Thigh & Knee Left Only] : normal left lower medial thigh and knee [] : normal left dorsum of the foot [Abnormal Walk] : normal gait [Dysdiadochokinesia Bilaterally] : not present [2+] : Patella left 2+ [0] : Ankle jerk left 0 [Plantar Reflex Right Only] : normal on the right [Plantar Reflex Left Only] : normal on the left [___] : absent on the right [___] : absent on the left

## 2022-12-23 NOTE — DISCUSSION/SUMMARY
[FreeTextEntry1] : 67-year-old man with a history of chronic back pain, occasional sciatic pain, more on the right than the left.  With numbness of the legs, especially on the right.  Seems to follow a L5, S1 distribution.\par This seems to be confirmed by the recent MRI of the lumbosacral spine.\par Patient made aware of study findings.\par Will follow-up with neurosurgery.

## 2022-12-23 NOTE — PROCEDURE
[FreeTextEntry1] : Electromyography and nerve conduction study of the lower extremities demonstrates evidence of bilateral peroneal and tibial motor reduced amplitudes consistent with axonal loss.  Preserved sensory reflexes, argue against an underlying neuropathy.\par Denervation activity noted bilaterally specially at the L5, S1 innervated muscle groups.

## 2022-12-23 NOTE — REVIEW OF SYSTEMS
[As Noted in HPI] : as noted in HPI [Numbness] : numbness [Tingling] : tingling [Negative] : Heme/Lymph [FreeTextEntry9] : Manic back pain

## 2023-01-04 ENCOUNTER — APPOINTMENT (OUTPATIENT)
Dept: NEUROSURGERY | Facility: CLINIC | Age: 68
End: 2023-01-04
Payer: MEDICARE

## 2023-01-04 VITALS
DIASTOLIC BLOOD PRESSURE: 82 MMHG | SYSTOLIC BLOOD PRESSURE: 166 MMHG | TEMPERATURE: 98.2 F | OXYGEN SATURATION: 100 % | HEART RATE: 72 BPM

## 2023-01-04 DIAGNOSIS — M51.16 INTERVERTEBRAL DISC DISORDERS WITH RADICULOPATHY, LUMBAR REGION: ICD-10-CM

## 2023-01-04 DIAGNOSIS — R20.0 ANESTHESIA OF SKIN: ICD-10-CM

## 2023-01-04 DIAGNOSIS — R25.2 CRAMP AND SPASM: ICD-10-CM

## 2023-01-04 DIAGNOSIS — M54.50 LOW BACK PAIN, UNSPECIFIED: ICD-10-CM

## 2023-01-04 PROCEDURE — 99215 OFFICE O/P EST HI 40 MIN: CPT

## 2023-01-04 NOTE — CONSULT LETTER
[Dear  ___] : Dear  [unfilled], [Courtesy Letter:] : I had the pleasure of seeing your patient, [unfilled], in my office today. [Sincerely,] : Sincerely, [FreeTextEntry2] : Yong Herron  [address],  [place] [id number]   [FreeTextEntry1] : Mr. June is a very pleasant 67-year-old male patient who was seen in our office today.  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  The patient continues to complain of back pain and numbness below the knee in an L5 distribution.  The patient states that his severe radicular pain which he experienced previously has resolved completely.  The patient's pain is primarily present when sitting for prolonged periods of time and is somewhat better when standing.  The patient also complains of an unusual pinching sensation at the tip of his penis which is being investigated by his urologist.  The patient also has an inguinal hernia for which he is considering surgical intervention.  The patient also complains of bilateral cramping in the legs worse at night and worse on the right calf.\par \par On examination, the patient is alert, oriented, and compliant with the exam.  The patient demonstrates a slightly stooped posture but ambulates well.  The patient demonstrates full strength in the lower extremities bilaterally with hip flexion, knee extension, ankle dorsiflexion, ankle plantarflexion, and extension of the hallucis longus.  The patient's numbness is primarily below the knee and does not usually encompass the thigh region.\par \par The patient is accompanied with images of the lumbar spine dated March 22, 2022.  The patient has a transitional lumbarized S1.  These images demonstrate mild to moderate degenerative changes of the lumbar spine worst at L4/5 and L5/S1.  At L5/S1, the patient has bilateral lateral recess stenosis but without overt nerve root compression.  The patient's previous images from January 2021 demonstrated a sizable synovial cyst and right-sided disc herniation causing compression of the right S1 traversing nerve root. These findings have almost completely resolved on his recent images. \par \par Taken together, the patient has a clinical history and radiographic findings most consistent with a persistent lumbar radiculopathy.  At this time, I have recommended an updated MRI scan to rule out a recurrent synovial cyst and/ordisc herniation.  I have also recommended flexion/extension x-rays and scoliosis x-rays because of the patient's persistent low back pain which does not radiate.  Because of the patient's persistent numbness, I have also recommended EMG/nerve conduction studies to rule out the possibility of a peripheral neuropathy causing the patient's symptoms as opposed to his chronic radiculopathy.  Finally, I have recommended ultrasounds of the lower extremities bilaterally to rule out the possibility of a vascular issue.  The patient will be following up with us once his tests are complete so that we can review them together and develop a more specific treatment plan going forward.\par  [FreeTextEntry3] : Anderson Wilson MD, PhD, CS, FAANS Attending Neurosurgeon  of Neurosurgery [facility] at [facility] at [address], [place] [address] Office: [phone] Fax: [phone]

## 2023-01-04 NOTE — CONSULT LETTER
[Dear  ___] : Dear  [unfilled], [Courtesy Letter:] : I had the pleasure of seeing your patient, [unfilled], in my office today. [Sincerely,] : Sincerely, [FreeTextEntry2] : Yong Herron  [address],  [place] [id number]     [FreeTextEntry1] : Mr. June is a very pleasant 67-year-old male patient who was seen in our office today in follow-up.  The patient was previously seen in regards to back pain and bilateral leg symptoms.\par \par I am happy to report that the patient's low back pain has improved but not resolved completely.  The patient's back pain is nonfocal and encompasses most of his lumbar spine and flank which is worse with flexion motions.  The patient denies severe focal pain.  The patient continues to complain of numbness in the right leg mostly below the knee into the foot and in the last 4 digits.  This has been longstanding and previously attributed to a chronic radiculopathy secondary to a synovial cyst and disc herniation at the L5/S1 level.  Finally, the patient complains of cramping and muscle spasms which worsen at night.  The patient additionally complains of occasional numbness and pain in the left foot as a result of remote trauma.\par \par On examination, the patient is alert, oriented, and compliant with the exam.  The patient demonstrates full strength in the upper and lower extremities bilaterally.  The patient ambulates well.  The patient demonstrates 2+ reflexes at the patella tendons bilaterally.\par \par The patient is accompanied with an MRI scan of the lumbar spine dated December 14, 2022.  The patient has a lumbarized S1.  These images demonstrate mild degenerative changes most prominently at L5/S1 where the patient has a central disc bulge causing mild lateral recess and foraminal stenosis.  At L4/5, the patient has edema around the interspinous ligament posteriorly but without any evidence of dynamic instability.  The patient's previous disc herniation and synovial cyst noted at L5/S1 on the right appears completely resolved.  The patient is additionally accompanied with x-rays of the lumbar spine dated December 14, 2022.  These images support the possibility of Somervell's disease at L4/5.  Finally, the patient is accompanied with scoliosis views performed on December 14, 2022.  These images demonstrate a mild leftward coronal imbalance measuring approximately 2 cm towards the left and a good sagittal vertical alignment measuring 0 cm from the C7 marcela line.  The patient is accompanied with EMG/nerve conduction studies which were performed on December 23, 2022.  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  The patient's structural lesions at this level have resolved spontaneously and thus I explained to the patient that there is currently no surgical option available at this time.  I explained to the patient that his right-sided numbness and pain may be permanent as a result of his original injury.  At this time, the patient states that he is able to manage without significant difficulty although it can be annoying at times.  The patient's EMG/nerve conduction study is a little inconsistent with the patient's symptoms and includes peroneal and tibial nerve involvement bilaterally.  As such, I have recommended vascular imaging to rule out the possibility of peripheral arterial disease.  The patient will be contacted with the results of these images when they become available. With regards to his back, I explained to the patient that his clinical history and description of pain is most consistent with muscular tightness.  As such, I have recommended lumbar spine range of motion exercises in addition to his bilateral lower extremity exercises.  I also went over the significance of his radiographic findings at the spinous processes of L4 and 5.  I explained to the patient that Somervell's disease may present as a severe sharp pain in the mid back which could be amenable to surgical intervention.  However, the patient's pain at this time is not consistent with arthritic or bony pain and thus this may represent an incidental finding.  At this time, the patient has follow-up scheduled with us in 2 to 3 months to reevaluate his progress and I look forward to seeing the patient back then.  The patient is aware that he may contact our office anytime sooner should the need arise. [FreeTextEntry3] : Anderson Wilson MD, PhD, CS, FAANS Attending Neurosurgeon  of Neurosurgery [facility] at [facility] at [address], [place] [address] Office: [phone] Fax: [phone]

## 2023-01-10 ENCOUNTER — NON-APPOINTMENT (OUTPATIENT)
Age: 68
End: 2023-01-10

## 2023-02-12 ENCOUNTER — RX RENEWAL (OUTPATIENT)
Age: 68
End: 2023-02-12

## 2023-03-13 ENCOUNTER — EMERGENCY (EMERGENCY)
Facility: HOSPITAL | Age: 68
LOS: 1 days | Discharge: HOME | End: 2023-03-13
Attending: EMERGENCY MEDICINE | Admitting: EMERGENCY MEDICINE
Payer: MEDICARE

## 2023-03-13 VITALS
DIASTOLIC BLOOD PRESSURE: 75 MMHG | SYSTOLIC BLOOD PRESSURE: 134 MMHG | TEMPERATURE: 98 F | HEART RATE: 65 BPM | OXYGEN SATURATION: 99 % | RESPIRATION RATE: 16 BRPM

## 2023-03-13 VITALS
HEIGHT: 69 IN | HEART RATE: 71 BPM | TEMPERATURE: 98 F | SYSTOLIC BLOOD PRESSURE: 190 MMHG | RESPIRATION RATE: 14 BRPM | OXYGEN SATURATION: 99 % | WEIGHT: 177.91 LBS | DIASTOLIC BLOOD PRESSURE: 100 MMHG

## 2023-03-13 LAB
ALBUMIN SERPL ELPH-MCNC: 4.1 G/DL — SIGNIFICANT CHANGE UP (ref 3.3–5)
ALP SERPL-CCNC: 91 U/L — SIGNIFICANT CHANGE UP (ref 30–120)
ALT FLD-CCNC: 26 U/L DA — SIGNIFICANT CHANGE UP (ref 10–60)
ANION GAP SERPL CALC-SCNC: 7 MMOL/L — SIGNIFICANT CHANGE UP (ref 5–17)
APTT BLD: 30.5 SEC — SIGNIFICANT CHANGE UP (ref 27.5–35.5)
AST SERPL-CCNC: 25 U/L — SIGNIFICANT CHANGE UP (ref 10–40)
BASOPHILS # BLD AUTO: 0.03 K/UL — SIGNIFICANT CHANGE UP (ref 0–0.2)
BASOPHILS NFR BLD AUTO: 0.6 % — SIGNIFICANT CHANGE UP (ref 0–2)
BILIRUB SERPL-MCNC: 0.8 MG/DL — SIGNIFICANT CHANGE UP (ref 0.2–1.2)
BUN SERPL-MCNC: 18 MG/DL — SIGNIFICANT CHANGE UP (ref 7–23)
CALCIUM SERPL-MCNC: 9.5 MG/DL — SIGNIFICANT CHANGE UP (ref 8.4–10.5)
CHLORIDE SERPL-SCNC: 104 MMOL/L — SIGNIFICANT CHANGE UP (ref 96–108)
CO2 SERPL-SCNC: 29 MMOL/L — SIGNIFICANT CHANGE UP (ref 22–31)
CREAT SERPL-MCNC: 0.95 MG/DL — SIGNIFICANT CHANGE UP (ref 0.5–1.3)
EGFR: 88 ML/MIN/1.73M2 — SIGNIFICANT CHANGE UP
EOSINOPHIL # BLD AUTO: 0.14 K/UL — SIGNIFICANT CHANGE UP (ref 0–0.5)
EOSINOPHIL NFR BLD AUTO: 2.9 % — SIGNIFICANT CHANGE UP (ref 0–6)
GLUCOSE SERPL-MCNC: 124 MG/DL — HIGH (ref 70–99)
HCT VFR BLD CALC: 38.6 % — LOW (ref 39–50)
HGB BLD-MCNC: 13.3 G/DL — SIGNIFICANT CHANGE UP (ref 13–17)
IMM GRANULOCYTES NFR BLD AUTO: 0.4 % — SIGNIFICANT CHANGE UP (ref 0–0.9)
INR BLD: 0.93 RATIO — SIGNIFICANT CHANGE UP (ref 0.88–1.16)
LYMPHOCYTES # BLD AUTO: 1.39 K/UL — SIGNIFICANT CHANGE UP (ref 1–3.3)
LYMPHOCYTES # BLD AUTO: 29.2 % — SIGNIFICANT CHANGE UP (ref 13–44)
MCHC RBC-ENTMCNC: 30.2 PG — SIGNIFICANT CHANGE UP (ref 27–34)
MCHC RBC-ENTMCNC: 34.5 GM/DL — SIGNIFICANT CHANGE UP (ref 32–36)
MCV RBC AUTO: 87.7 FL — SIGNIFICANT CHANGE UP (ref 80–100)
MONOCYTES # BLD AUTO: 0.44 K/UL — SIGNIFICANT CHANGE UP (ref 0–0.9)
MONOCYTES NFR BLD AUTO: 9.2 % — SIGNIFICANT CHANGE UP (ref 2–14)
NEUTROPHILS # BLD AUTO: 2.74 K/UL — SIGNIFICANT CHANGE UP (ref 1.8–7.4)
NEUTROPHILS NFR BLD AUTO: 57.7 % — SIGNIFICANT CHANGE UP (ref 43–77)
NRBC # BLD: 0 /100 WBCS — SIGNIFICANT CHANGE UP (ref 0–0)
PLATELET # BLD AUTO: 202 K/UL — SIGNIFICANT CHANGE UP (ref 150–400)
POTASSIUM SERPL-MCNC: 3.7 MMOL/L — SIGNIFICANT CHANGE UP (ref 3.5–5.3)
POTASSIUM SERPL-SCNC: 3.7 MMOL/L — SIGNIFICANT CHANGE UP (ref 3.5–5.3)
PROT SERPL-MCNC: 7.4 G/DL — SIGNIFICANT CHANGE UP (ref 6–8.3)
PROTHROM AB SERPL-ACNC: 10.7 SEC — SIGNIFICANT CHANGE UP (ref 10.5–13.4)
RBC # BLD: 4.4 M/UL — SIGNIFICANT CHANGE UP (ref 4.2–5.8)
RBC # FLD: 13.8 % — SIGNIFICANT CHANGE UP (ref 10.3–14.5)
SARS-COV-2 RNA SPEC QL NAA+PROBE: SIGNIFICANT CHANGE UP
SODIUM SERPL-SCNC: 140 MMOL/L — SIGNIFICANT CHANGE UP (ref 135–145)
TROPONIN I, HIGH SENSITIVITY RESULT: 4.6 NG/L — SIGNIFICANT CHANGE UP
WBC # BLD: 4.76 K/UL — SIGNIFICANT CHANGE UP (ref 3.8–10.5)
WBC # FLD AUTO: 4.76 K/UL — SIGNIFICANT CHANGE UP (ref 3.8–10.5)

## 2023-03-13 PROCEDURE — 99285 EMERGENCY DEPT VISIT HI MDM: CPT

## 2023-03-13 PROCEDURE — 70450 CT HEAD/BRAIN W/O DYE: CPT | Mod: MA

## 2023-03-13 PROCEDURE — 71045 X-RAY EXAM CHEST 1 VIEW: CPT | Mod: 26

## 2023-03-13 PROCEDURE — 93010 ELECTROCARDIOGRAM REPORT: CPT

## 2023-03-13 PROCEDURE — 70498 CT ANGIOGRAPHY NECK: CPT | Mod: 26,MA

## 2023-03-13 PROCEDURE — 36415 COLL VENOUS BLD VENIPUNCTURE: CPT

## 2023-03-13 PROCEDURE — 85730 THROMBOPLASTIN TIME PARTIAL: CPT

## 2023-03-13 PROCEDURE — 99285 EMERGENCY DEPT VISIT HI MDM: CPT | Mod: 25

## 2023-03-13 PROCEDURE — 84484 ASSAY OF TROPONIN QUANT: CPT

## 2023-03-13 PROCEDURE — 70496 CT ANGIOGRAPHY HEAD: CPT | Mod: MA

## 2023-03-13 PROCEDURE — 71045 X-RAY EXAM CHEST 1 VIEW: CPT

## 2023-03-13 PROCEDURE — 70498 CT ANGIOGRAPHY NECK: CPT | Mod: MA

## 2023-03-13 PROCEDURE — 70496 CT ANGIOGRAPHY HEAD: CPT | Mod: 26,MA

## 2023-03-13 PROCEDURE — 93005 ELECTROCARDIOGRAM TRACING: CPT

## 2023-03-13 PROCEDURE — 82962 GLUCOSE BLOOD TEST: CPT

## 2023-03-13 PROCEDURE — 85025 COMPLETE CBC W/AUTO DIFF WBC: CPT

## 2023-03-13 PROCEDURE — 87635 SARS-COV-2 COVID-19 AMP PRB: CPT

## 2023-03-13 PROCEDURE — 85610 PROTHROMBIN TIME: CPT

## 2023-03-13 PROCEDURE — 80053 COMPREHEN METABOLIC PANEL: CPT

## 2023-03-13 NOTE — ED PROVIDER NOTE - NIH STROKE SCALE: 5A. MOTOR ARM, LEFT, QM
[Alert] : alert [Well Nourished] : well nourished [Healthy Appearance] : healthy appearance [No Acute Distress] : no acute distress [No Proptosis] : no proptosis [No Lid Lag] : no lid lag [No LAD] : no lymphadenopathy [Thyroid Not Enlarged] : the thyroid was not enlarged [No Thyroid Nodules] : no palpable thyroid nodules [No Accessory Muscle Use] : no accessory muscle use [Clear to Auscultation] : lungs were clear to auscultation bilaterally [Normal S1, S2] : normal S1 and S2 [No Murmurs] : no murmurs [Normal Rate] : heart rate was normal [Not Tender] : non-tender [Soft] : abdomen soft [No CVA Tenderness] : no ~M costovertebral angle tenderness [No Stigmata of Cushings Syndrome] : no stigmata of Cushings Syndrome [Normal Reflexes] : deep tendon reflexes were 2+ and symmetric [Oriented x3] : oriented to person, place, and time [Abdominal Striae] : no abdominal striae [Acanthosis Nigricans] : no acanthosis nigricans [Hirsutism] : no hirsutism (0) No drift; limb holds 90 (or 45) degrees for full 10 secs

## 2023-03-13 NOTE — ED PROVIDER NOTE - CARE PROVIDER_API CALL
Shavonne Brown  NEUROLOGY  [address] [place] [address]  Phone: [phone]  Fax: [phone]  Follow Up Time: 1-3 Days

## 2023-03-13 NOTE — CONSULT NOTE ADULT - SUBJECTIVE AND OBJECTIVE BOX
Patient is a 68y old  Male who presents with a chief complaint of dizziness    HPI: 68-year-old male with history of hypertension and tinnitus complaining of dizzy spell this morning at 7 AM.  States he woke up several minutes before that and was well.  Went to bathroom and then while returning to bedroom noticed that the room was spinning.  Says he lied back down for a few minutes and felt better.  Symptoms have mostly resolved now.  Denies headache chest pain nausea vomiting fever cough shortness of breath or other symptoms.  Has not had prior neuro work-up.  States he had a hearing test and was told he had normal hearing last year.  His PCP is Dr. Marshall.  BP was 190/100  No weakness, numbness.  No facial asymmetry  NIHSS - 0    PAST MEDICAL & SURGICAL HISTORY:    HTN (hypertension)    Home Medications:    Coreg:  (30 Dec 2020 19:12)  losartan:  (30 Dec 2020 19:12)    Allergies    No Known Allergies    SOCIAL HISTORY:    No h/o Smoking.   Pt does drink socially.    FAMILY HISTORY:  Asked. N/C    REVIEW OF SYSTEMS:    CONSTITUTIONAL: No fever  EYES: No eye pain,   ENMT:  No sinus or throat pain  NECK: No pain or stiffness  RESPIRATORY: No cough, No hemoptysis; No shortness of breath  CARDIOVASCULAR: No acute chest pain, palpitations,  or leg swelling  GASTROINTESTINAL: No abdominal pain. No nausea, vomiting, or hematemesis;    GENITOURINARY: No  hematuria, or incontinence  MUSCULOSKELETAL: No joint swelling; No extremity pain  SKIN: No itching, rashes, or lesions   LYMPH NODES: No enlarged glands  NEUROLOGICAL: No headaches, memory loss,   PSYCHIATRIC: No depression, anxiety, mood swings, or difficulty sleeping  ENDOCRINE: No heat or cold intolerance;   HEME/LYMPH: No easy bruising, or bleeding gums  Allergy/Immunology. No medication allergy. No seasonal allergies.    PHYSICAL EXAM:  Vital Signs Last 24 Hrs  T(F): 97.6 (03-13-23 @ 09:48)  HR: 72 (03-13-23 @ 10:40)  BP: 168/77 (03-13-23 @ 10:40)  RR: 14 (03-13-23 @ 10:40)    GENERAL: NAD, well-groomed, well-developed  HEAD:  Atraumatic, Normocephalic  EYES: EOMI, PERRLA, conjunctiva and sclera clear  NECK: Supple, No JVD,     On Neurological Examination:    Mental Status - Pt is alert, awake, oriented X3. Higher functions are intact.  Follows commands well and able to answer questions appropriately.    Speech -  [place]. Pt has no aphasia.    Cranial Nerves - Pupils 3 mm equal and reactive to light, extraocular eye movements intact. Pt has no visual field deficit.  No facial asymmetry. Tongue - is in midline.    Motor Exam - 4 plus/5 all over, No drift. No shaking or tremors.  Muscle tone - is normal all over. Moves all extremities equally. No asymmetry is seen.      Sensory Exam - Pin prick, temperature, joint position and vibration are intact on either side. Pt withdraws all extremities equally on stimulation.    Gait - Able to stand and walk unassisted.  Not falling to either side.    Deep tendon Reflexes - 2 plus all over.    Coordination - Fine finger movements are normal on both sides. Finger to nose is also normal on both sides.       Romberg - Negative.    Neck Supple -  Yes.    LABS:                        13.3   4.76  )-----------( 202      ( 13 Mar 2023 10:02 )             38.6     03-13    140  |  104  |  18  ----------------------------<  124<H>  3.7   |  29  |  0.95    Ca    9.5      13 Mar 2023 10:02    TPro  7.4  /  Alb  4.1  /  TBili  0.8  /  DBili  x   /  AST  25  /  ALT  26  /  AlkPhos  91  03-13    PT/INR - ( 13 Mar 2023 10:02 )   PT: 10.7 sec;   INR: 0.93 ratio        PTT - ( 13 Mar 2023 10:02 )  PTT:30.5 sec    RADIOLOGY & ADDITIONAL STUDIES:    < from: CT Brain Stroke Protocol (03.13.23 @ 10:18) >  IMPRESSION:        1.   Right carotid system:  No hemodynamically significant stenosis.        2.   Left carotid system:  No hemodynamically significant stenosis.        3.   Intracranial circulation:  No significant vascular lesion.        4.   Brain:  No significant lesion identified.    Critical value:  I discussed the finding of this report with Dr. Bass at 10:40 AM on 03/13/2023.  Critical value policy of the   hospital was followed.  Read back and confirmation of receipt of this   communication was performed.  This verbal communication supplements the   text report of this document.    --- End of Report ---      < end of copied text >

## 2023-03-13 NOTE — ED PROVIDER NOTE - OBJECTIVE STATEMENT
68-year-old male with history of hypertension and tinnitus complaining of dizzy spell this morning at 7 AM.  States he woke up several minutes before that and was well.  Went to bathroom and then while returning to bedroom noticed that the room was spinning.  Says he lied back down for a few minutes and felt better.  Symptoms have mostly resolved now.  Denies headache chest pain nausea vomiting fever cough shortness of breath or other symptoms.  Has not had prior neuro work-up.  States he had a hearing test and was told he had normal hearing last year.  His PCP is Dr. Marshall.

## 2023-03-13 NOTE — ED ADULT NURSE NOTE - CADM POA CENTRAL LINE
----- Message from Tatyana Mtz RN sent at 11/27/2019 11:59 AM CST -----  Good Morning,    Patient was seen today for Diabetes Education. Patient wanted me to tell you that since she has started colchicine- she has been experiencing SOB.     Please notify patient of your recommendations.    Thank you,  Tatyana Mtz RN CCM  Diabetes Education     
Pt states that ever since she started on the Colchicine she has been experiencing SOB   
No

## 2023-03-13 NOTE — CONSULT NOTE ADULT - ASSESSMENT
Dizziness  Elevated BP  Non focal exam.  No signs of TIA/CVA  CT Head and CTA heads and Neck - No pathology  Dx - Hypertensive urgency  BP meds.  Add Ecotrin 81 mg daily.  F/up with neurologist Dr. Borwn - [phone] for out pt MRI brain.  Counseling is done for medication compliance.
The patient is a 68 year old male with a history of HTN who presents with dizziness.    Plan:  - ECG with no evidence of ischemia or infarction  - Chest pain - atypical. Given duration of symptoms, an acute MI is ruled out with one set of cardiac enzymes.  - BP initially elevated at 190/100 but improved to 150s systolic without intervention  - Continue carvedilol 12.5 mg bid  - Continue valsartan 320-25 mg daily  - CTA head and neck with no acute pathology or stenosis  - Neurology eval  - From a cardiac standpoint, the patient can be discharged home and follow-up as an outpatient for echo. If he is admitted from a neurologic standpoint, check echo as inpatient.

## 2023-03-13 NOTE — ED ADULT NURSE NOTE - OBJECTIVE STATEMENT
69 y/o male received aox4 ambulatory c/o sudden onset of dizziness today when he woke up. pt reports he feels the room spinning around him. denies h/a, n/v/d/sob/cp.

## 2023-03-13 NOTE — ED PROVIDER NOTE - CLINICAL SUMMARY MEDICAL DECISION MAKING FREE TEXT BOX
68-year-old male with history of hypertension and tinnitus complaining of dizzy spell this morning at 7 AM.  States he woke up several minutes before that and was well.  Went to bathroom and then while returning to bedroom noticed that the room was spinning.  Says he lied back down for a few minutes and felt better.  Symptoms have mostly resolved now.  Denies headache chest pain nausea vomiting fever cough shortness of breath or other symptoms.  Has not had prior neuro work-up.  States he had a hearing test and was told he had normal hearing last year.  His PCP is Dr. Marshall.    Physical exam is normal no indication of stroke but will do stroke work-up due to atypical symptoms.  Could be labyrinthitis/vertigo.  We will get neuro consult and consider meclizine.  Blood pressure also high will reassess and if necessary give antihypertensive medication.

## 2023-03-13 NOTE — ED PROVIDER NOTE - NSFOLLOWUPINSTRUCTIONS_ED_ALL_ED_FT
Take aspirin daily as directed. Follow up with neurology for outpatient MRI as discussed.    Dizziness      Dizziness is a common problem. It makes you feel unsteady or light-headed. You may feel like you are about to pass out (faint). Dizziness can lead to getting hurt if you stumble or fall. Dizziness can be caused by many things, including:  •Medicines.      •Not having enough water in your body (dehydration).      •Illness.        Follow these instructions at home:      Eating and drinking   A comparison of three sample cups showing dark yellow, yellow, and pale yellow urine.   •Drink enough fluid to keep your pee (urine) pale yellow. This helps to keep you from getting dehydrated. Try to drink more clear fluids, such as water.      • Do not drink alcohol.      •Limit how much caffeine you drink or eat, if your doctor tells you to do that.      •Limit how much salt (sodium) you drink or eat, if your doctor tells you to do that.        Activity   A sign showing that a person should not drive. •Avoid making quick movements.  •Stand up slowly from sitting in a chair, and steady yourself until you feel okay.      •In the morning, first sit up on the side of the bed. When you feel okay, stand up slowly while you hold onto something. Do this until you know that your balance is okay.        •If you need to  one place for a long time, move your legs often. Tighten and relax the muscles in your legs while you are standing.      • Do not drive or use machinery if you feel dizzy.      •Avoid bending down if you feel dizzy. Place items in your home so you can reach them easily without leaning over.      Lifestyle     • Do not smoke or use any products that contain nicotine or tobacco. If you need help quitting, ask your doctor.      •Try to lower your stress level. You can do this by using methods such as yoga or meditation. Talk with your doctor if you need help.      General instructions     •Watch your dizziness for any changes.      •Take over-the-counter and prescription medicines only as told by your doctor. Talk with your doctor if you think that you are dizzy because of a medicine that you are taking.      •Tell a friend or a family member that you are feeling dizzy. If he or she notices any changes in your behavior, have this person call your doctor.      •Keep all follow-up visits.        Contact a doctor if:    •Your dizziness does not go away.      •Your dizziness or light-headedness gets worse.      •You feel like you may vomit (are nauseous).      •You have trouble hearing.      •You have new symptoms.      •You are unsteady on your feet.      •You feel like the room is spinning.      •You have neck pain or a stiff neck.      •You have a fever.        Get help right away if:    •You vomit or have watery poop (diarrhea), and you cannot eat or drink anything.    •You have trouble:  •Talking.      •Walking.      •Swallowing.      •Using your arms, hands, or legs.        •You feel generally weak.      •You are not thinking clearly, or you have trouble forming sentences. A friend or family member may notice this.    •You have:  •Chest pain.      •Pain in your belly (abdomen).      •Shortness of breath.      •Sweating.        •Your vision changes.      •You are bleeding.      •You have a very bad headache.      These symptoms may be an emergency. Get help right away. Call your local emergency services (911 in the [place]   • Do not wait to see if the symptoms will go away.        • Do not drive yourself to the hospital.         Summary    •Dizziness makes you feel unsteady or light-headed. You may feel like you are about to pass out (faint).      •Drink enough fluid to keep your pee (urine) pale yellow. Do not drink alcohol.      •Avoid making quick movements if you feel dizzy.      •Watch your dizziness for any changes.      This information is not intended to replace advice given to you by your health care provider. Make sure you discuss any questions you have with your health care provider.

## 2023-03-13 NOTE — ED PROVIDER NOTE - ENMT, MLM
Airway patent, Nasal mucosa clear. Mouth with normal mucosa. Throat has no vesicles, no oropharyngeal exudates and uvula is midline.  Ears are clear.  Neck is supple full range of motion intact nontender.  There is no scalp tenderness or deformity.  There is no nystagmus.

## 2023-03-13 NOTE — ED PROVIDER NOTE - PATIENT PORTAL LINK FT
You can access the FollowMyHealth Patient Portal offered by [facility] by registering at the following website: [email or web address]. By joining [employer]’s FollowMyHealth portal, you will also be able to view your health information using other applications (apps) compatible with our system.

## 2023-03-13 NOTE — ED ADULT NURSE NOTE - INTERVENTIONS DEFINITIONS
[place] to call system/Call bell, personal items and telephone within reach/Instruct patient to call for assistance/Stretcher in lowest position, wheels locked, appropriate side rails in place/Monitor for mental status changes and reorient to person, place, and time

## 2023-03-13 NOTE — CONSULT NOTE ADULT - SUBJECTIVE AND OBJECTIVE BOX
History of Present Illness:    Past Medical/Surgical History:    Medications:    Family History: Non-contributory family history of premature cardiovascular atherosclerotic disease    Social History: No tobacco, alcohol or drug use    Review of Systems:  General: No fevers, chills, weight gain  Skin: No rashes, color changes  Cardiovascular: No chest pain, orthopnea  Respiratory: No shortness of breath, cough  Gastrointestinal: No nausea, abdominal pain  Genitourinary: No incontinence, pain with urination  Musculoskeletal: No pain, swelling, decreased range of motion  Neurological: No headache, weakness  Psychiatric: No depression, anxiety  Endocrine: No weight gain, increased thirst  All other systems are comprehensively negative.    Physical Exam:  Vitals:        Vital Signs Last 24 Hrs  T(C): 36.4 (13 Mar 2023 09:48), Max: 36.4 (13 Mar 2023 09:48)  T(F): 97.6 (13 Mar 2023 09:48), Max: 97.6 (13 Mar 2023 09:48)  HR: 72 (13 Mar 2023 10:40) (71 - 72)  BP: 168/77 (13 Mar 2023 10:40) (168/77 - 190/100)  BP(mean): --  RR: 14 (13 Mar 2023 10:40) (14 - 14)  SpO2: 100% (13 Mar 2023 10:40) (99% - 100%)    Parameters below as of 13 Mar 2023 10:40  Patient On (Oxygen Delivery Method): room air      General: NAD  HEENT: MMM  Neck: No JVD, no carotid bruit  Lungs: CTAB  CV: RRR, nl S1/S2, no M/R/G  Abdomen: S/NT/ND, +BS  Extremities: No LE edema, no cyanosis  Neuro: AAOx3, non-focal  Skin: No rash    Labs:                        13.3   4.76  )-----------( 202      ( 13 Mar 2023 10:02 )             38.6     03-13    140  |  104  |  18  ----------------------------<  124<H>  3.7   |  29  |  0.95    Ca    9.5      13 Mar 2023 10:02    TPro  7.4  /  Alb  4.1  /  TBili  0.8  /  DBili  x   /  AST  25  /  ALT  26  /  AlkPhos  91  03-13        PT/INR - ( 13 Mar 2023 10:02 )   PT: 10.7 sec;   INR: 0.93 ratio         PTT - ( 13 Mar 2023 10:02 )  PTT:30.5 sec    ECG/Telemetry: NSR with sinus arrhythmia, normal axis, no ST abnormality     History of Present Illness: The patient is a 68 year old male with a history of HTN who presents with dizziness. He states when lying down after he returned from the bathroom, he started to feel dizzy as if the room was spinning. Symptoms lasted for about a minute but he states that when he sits or stands up, he feels imbalance issues. No shortness of breath, palpitations. He has had a mild, dull left-sided chest discomfort over the past 2-3 days.    Past Medical/Surgical History:  HTN    Medications:  Home Medications:  Coreg 12.5 mg bid:  (30 Dec 2020 19:12)  valsartan-HCTZ 320-25 mg daily:  (30 Dec 2020 19:12)      Family History: Non-contributory family history of premature cardiovascular atherosclerotic disease    Social History: No tobacco, alcohol or drug use    Review of Systems:  General: No fevers, chills, weight gain  Skin: No rashes, color changes  Cardiovascular: No chest pain, orthopnea  Respiratory: No shortness of breath, cough  Gastrointestinal: No nausea, abdominal pain  Genitourinary: No incontinence, pain with urination  Musculoskeletal: No pain, swelling, decreased range of motion  Neurological: No headache, weakness  Psychiatric: No depression, anxiety  Endocrine: No weight gain, increased thirst  All other systems are comprehensively negative.    Physical Exam:  Vitals:        Vital Signs Last 24 Hrs  T(C): 36.4 (13 Mar 2023 09:48), Max: 36.4 (13 Mar 2023 09:48)  T(F): 97.6 (13 Mar 2023 09:48), Max: 97.6 (13 Mar 2023 09:48)  HR: 72 (13 Mar 2023 10:40) (71 - 72)  BP: 168/77 (13 Mar 2023 10:40) (168/77 - 190/100)  BP(mean): --  RR: 14 (13 Mar 2023 10:40) (14 - 14)  SpO2: 100% (13 Mar 2023 10:40) (99% - 100%)  Parameters below as of 13 Mar 2023 10:40  Patient On (Oxygen Delivery Method): room air  General: NAD  HEENT: MMM  Neck: No JVD, no carotid bruit  Lungs: CTAB  CV: RRR, nl S1/S2, no M/R/G  Abdomen: S/NT/ND, +BS  Extremities: No LE edema, no cyanosis  Neuro: AAOx3, non-focal  Skin: No rash    Labs:                        13.3   4.76  )-----------( 202      ( 13 Mar 2023 10:02 )             38.6     03-13    140  |  104  |  18  ----------------------------<  124<H>  3.7   |  29  |  0.95    Ca    9.5      13 Mar 2023 10:02    TPro  7.4  /  Alb  4.1  /  TBili  0.8  /  DBili  x   /  AST  25  /  ALT  26  /  AlkPhos  91  03-13        PT/INR - ( 13 Mar 2023 10:02 )   PT: 10.7 sec;   INR: 0.93 ratio         PTT - ( 13 Mar 2023 10:02 )  PTT:30.5 sec    ECG/Telemetry: NSR with sinus arrhythmia, normal axis, no ST abnormality

## 2023-03-21 ENCOUNTER — NON-APPOINTMENT (OUTPATIENT)
Age: 68
End: 2023-03-21

## 2023-03-21 ENCOUNTER — APPOINTMENT (OUTPATIENT)
Dept: CARDIOLOGY | Facility: CLINIC | Age: 68
End: 2023-03-21
Payer: MEDICARE

## 2023-03-21 VITALS
SYSTOLIC BLOOD PRESSURE: 150 MMHG | DIASTOLIC BLOOD PRESSURE: 77 MMHG | WEIGHT: 178 LBS | HEART RATE: 66 BPM | OXYGEN SATURATION: 100 % | HEIGHT: 69 IN | BODY MASS INDEX: 26.36 KG/M2

## 2023-03-21 PROCEDURE — 99204 OFFICE O/P NEW MOD 45 MIN: CPT | Mod: 25

## 2023-03-21 PROCEDURE — 93000 ELECTROCARDIOGRAM COMPLETE: CPT

## 2023-03-21 RX ORDER — AMLODIPINE BESYLATE 5 MG/1
5 TABLET ORAL DAILY
Qty: 90 | Refills: 0 | Status: DISCONTINUED | COMMUNITY
Start: 2022-08-01 | End: 2023-03-21

## 2023-03-21 NOTE — DISCUSSION/SUMMARY
[FreeTextEntry1] : 1.  htn - continue valsartan.  Will increase carvedilol and stop amlodipine.  Told him to cut salt intake by 50%\par \par 2.  hyperlipidemia -  continue atorvastatin.  discussed low cholesterol diet

## 2023-03-21 NOTE — HISTORY OF PRESENT ILLNESS
[FreeTextEntry1] : 68 year old with a history of hypertension and hyperlipidemia.  No cardiac disease and had a stress test 3 years ago which was fine.  He has no chest pain but can walk 2 miles without issues.  The only issue has been that he has herniated disk.  Has been following a diet.  No SHAUN< PND or orthopnea.

## 2023-03-31 ENCOUNTER — APPOINTMENT (OUTPATIENT)
Dept: INTERNAL MEDICINE | Facility: CLINIC | Age: 68
End: 2023-03-31
Payer: MEDICARE

## 2023-03-31 VITALS
BODY MASS INDEX: 26.36 KG/M2 | RESPIRATION RATE: 14 BRPM | OXYGEN SATURATION: 98 % | TEMPERATURE: 98.5 F | SYSTOLIC BLOOD PRESSURE: 130 MMHG | WEIGHT: 178 LBS | HEIGHT: 69 IN | HEART RATE: 87 BPM | DIASTOLIC BLOOD PRESSURE: 80 MMHG

## 2023-03-31 DIAGNOSIS — Z00.00 ENCOUNTER FOR GENERAL ADULT MEDICAL EXAMINATION W/OUT ABNORMAL FINDINGS: ICD-10-CM

## 2023-03-31 PROCEDURE — G0009: CPT

## 2023-03-31 PROCEDURE — 90677 PCV20 VACCINE IM: CPT

## 2023-03-31 PROCEDURE — G0439: CPT

## 2023-03-31 NOTE — END OF VISIT
[FreeTextEntry3] : "I, Marylou Browne, personally scribed the services dictated to me by Dr. Willi Marshall MD in this documentation on 03/31/2023 " \par \par "I Dr. Willi Marshall MD, personally performed the services described in this documentation on 03/31/2023 for the patient as scribed by Marylou Browne in my presence. I have reviewed and verified that all the information is accurate and true."

## 2023-03-31 NOTE — HISTORY OF PRESENT ILLNESS
[FreeTextEntry1] : annual physical  [de-identified] : JORDAN OLIVERA is a 68 year old M who presents today for annual physical. Pt has hx of HTN and HLD. Recently saw Cardiologist and sees Neurologist. Pt reports having an episode of dizziness on 03/13/2023 and going to the ER; possibly vertigo. Pt feels better

## 2023-03-31 NOTE — HEALTH RISK ASSESSMENT
[Good] : ~his/her~  mood as  good [Yes] : Yes [2 - 3 times a week (3 pts)] : 2 - 3  times a week (3 points) [1 or 2 (0 pts)] : 1 or 2 (0 points) [Never (0 pts)] : Never (0 points) [No] : In the past 12 months have you used drugs other than those required for medical reasons? No [No falls in past year] : Patient reported no falls in the past year [0] : 2) Feeling down, depressed, or hopeless: Not at all (0) [PHQ-2 Negative - No further assessment needed] : PHQ-2 Negative - No further assessment needed [None] : None [With Family] : lives with family [Employed] : employed [] :  [Feels Safe at Home] : Feels safe at home [Fully functional (bathing, dressing, toileting, transferring, walking, feeding)] : Fully functional (bathing, dressing, toileting, transferring, walking, feeding) [Fully functional (using the telephone, shopping, preparing meals, housekeeping, doing laundry, using] : Fully functional and needs no help or supervision to perform IADLs (using the telephone, shopping, preparing meals, housekeeping, doing laundry, using transportation, managing medications and managing finances) [Reports normal functional visual acuity (ie: able to read med bottle)] : Reports normal functional visual acuity [Smoke Detector] : smoke detector [Carbon Monoxide Detector] : carbon monoxide detector [Safety elements used in home] : safety elements used in home [Seat Belt] :  uses seat belt [Sunscreen] : uses sunscreen [Never] : Never [SGC0Sbeps] : 0 [Change in mental status noted] : No change in mental status noted [Reports changes in hearing] : Reports no changes in hearing [Reports changes in vision] : Reports no changes in vision [Reports changes in dental health] : Reports no changes in dental health [Guns at Home] : no guns at home [Travel to Developing Areas] : does not  travel to developing areas [TB Exposure] : is not being exposed to tuberculosis [Caregiver Concerns] : does not have caregiver concerns

## 2023-03-31 NOTE — PHYSICAL EXAM
[No Acute Distress] : no acute distress [Well Nourished] : well nourished [Well Developed] : well developed [Well-Appearing] : well-appearing [Normal Voice/Communication] : normal voice/communication [Normal Sclera/Conjunctiva] : normal sclera/conjunctiva [PERRL] : pupils equal round and reactive to light [EOMI] : extraocular movements intact [Normal Outer Ear/Nose] : the outer ears and nose were normal in appearance [Normal Oropharynx] : the oropharynx was normal [No JVD] : no jugular venous distention [No Lymphadenopathy] : no lymphadenopathy [Supple] : supple [Thyroid Normal, No Nodules] : the thyroid was normal and there were no nodules present [No Respiratory Distress] : no respiratory distress  [No Accessory Muscle Use] : no accessory muscle use [Clear to Auscultation] : lungs were clear to auscultation bilaterally [Normal Rate] : normal rate  [Regular Rhythm] : with a regular rhythm [Normal S1, S2] : normal S1 and S2 [No Murmur] : no murmur heard [No Carotid Bruits] : no carotid bruits [No Abdominal Bruit] : a ~M bruit was not heard ~T in the abdomen [No Varicosities] : no varicosities [Pedal Pulses Present] : the pedal pulses are present [No Edema] : there was no peripheral edema [No Palpable Aorta] : no palpable aorta [No Extremity Clubbing/Cyanosis] : no extremity clubbing/cyanosis [Soft] : abdomen soft [Non Tender] : non-tender [Non-distended] : non-distended [No HSM] : no HSM [Normal Bowel Sounds] : normal bowel sounds [Normal Supraclavicular Nodes] : no supraclavicular lymphadenopathy [Normal Posterior Cervical Nodes] : no posterior cervical lymphadenopathy [Normal Anterior Cervical Nodes] : no anterior cervical lymphadenopathy [No CVA Tenderness] : no CVA  tenderness [No Spinal Tenderness] : no spinal tenderness [No Joint Swelling] : no joint swelling [Grossly Normal Strength/Tone] : grossly normal strength/tone [No Rash] : no rash [Coordination Grossly Intact] : coordination grossly intact [No Focal Deficits] : no focal deficits [Normal Gait] : normal gait [Deep Tendon Reflexes (DTR)] : deep tendon reflexes were 2+ and symmetric [Speech Grossly Normal] : speech grossly normal [Memory Grossly Normal] : memory grossly normal [Normal Affect] : the affect was normal [Alert and Oriented x3] : oriented to person, place, and time [Normal Mood] : the mood was normal [Normal Insight/Judgement] : insight and judgment were intact [Normal TMs] : both tympanic membranes were normal [Normal Appearance] : normal in appearance [No Masses] : no palpable masses [Inguinal Hernia Right] : a right inguinal hernia was present [Inguinal Hernia Left] : a left inguinal hernia was present [] : which was reducible [Normal Sphincter Tone] : normal sphincter tone [No Mass] : no mass [de-identified] : no nystagmus, no ptosis

## 2023-03-31 NOTE — PLAN
[FreeTextEntry1] : advised to lose weight \par further instructions pending lab results \par continue medications \par recommend Colonoscopy

## 2023-04-04 LAB
25(OH)D3 SERPL-MCNC: 27 NG/ML
ALBUMIN SERPL ELPH-MCNC: 4.9 G/DL
ALP BLD-CCNC: 90 U/L
ALT SERPL-CCNC: 23 U/L
ANION GAP SERPL CALC-SCNC: 13 MMOL/L
APPEARANCE: CLEAR
AST SERPL-CCNC: 21 U/L
BACTERIA: NEGATIVE
BASOPHILS # BLD AUTO: 0.05 K/UL
BASOPHILS NFR BLD AUTO: 1.2 %
BILIRUB SERPL-MCNC: 0.6 MG/DL
BILIRUBIN URINE: NEGATIVE
BLOOD URINE: NEGATIVE
BUN SERPL-MCNC: 19 MG/DL
CALCIUM SERPL-MCNC: 10.1 MG/DL
CHLORIDE SERPL-SCNC: 102 MMOL/L
CHOLEST SERPL-MCNC: 148 MG/DL
CK SERPL-CCNC: 123 U/L
CO2 SERPL-SCNC: 28 MMOL/L
COLOR: NORMAL
CREAT SERPL-MCNC: 1.03 MG/DL
EGFR: 80 ML/MIN/1.73M2
EOSINOPHIL # BLD AUTO: 0.14 K/UL
EOSINOPHIL NFR BLD AUTO: 3.5 %
ESTIMATED AVERAGE GLUCOSE: 108 MG/DL
GLUCOSE QUALITATIVE U: NEGATIVE
GLUCOSE SERPL-MCNC: 119 MG/DL
HBA1C MFR BLD HPLC: 5.4 %
HCT VFR BLD CALC: 38.8 %
HDLC SERPL-MCNC: 46 MG/DL
HEMOCCULT STL QL IA: NEGATIVE
HGB BLD-MCNC: 12.7 G/DL
HYALINE CASTS: 0 /LPF
IMM GRANULOCYTES NFR BLD AUTO: 0.2 %
KETONES URINE: NEGATIVE
LDLC SERPL CALC-MCNC: 81 MG/DL
LEUKOCYTE ESTERASE URINE: NEGATIVE
LYMPHOCYTES # BLD AUTO: 1.27 K/UL
LYMPHOCYTES NFR BLD AUTO: 31.4 %
MAN DIFF?: NORMAL
MCHC RBC-ENTMCNC: 29.3 PG
MCHC RBC-ENTMCNC: 32.7 GM/DL
MCV RBC AUTO: 89.4 FL
MICROSCOPIC-UA: NORMAL
MONOCYTES # BLD AUTO: 0.42 K/UL
MONOCYTES NFR BLD AUTO: 10.4 %
NEUTROPHILS # BLD AUTO: 2.15 K/UL
NEUTROPHILS NFR BLD AUTO: 53.3 %
NITRITE URINE: NEGATIVE
NONHDLC SERPL-MCNC: 102 MG/DL
PH URINE: 7
PLATELET # BLD AUTO: 227 K/UL
POTASSIUM SERPL-SCNC: 4.6 MMOL/L
PROT SERPL-MCNC: 7.2 G/DL
PROTEIN URINE: NEGATIVE
PSA SERPL-MCNC: 1.55 NG/ML
RBC # BLD: 4.34 M/UL
RBC # FLD: 13.2 %
RED BLOOD CELLS URINE: 2 /HPF
SODIUM SERPL-SCNC: 143 MMOL/L
SPECIFIC GRAVITY URINE: 1.02
SQUAMOUS EPITHELIAL CELLS: 0 /HPF
TRIGL SERPL-MCNC: 107 MG/DL
TSH SERPL-ACNC: 2.93 UIU/ML
UROBILINOGEN URINE: NORMAL
WBC # FLD AUTO: 4.04 K/UL
WHITE BLOOD CELLS URINE: 0 /HPF

## 2023-04-05 LAB
FOLATE SERPL-MCNC: 12.1 NG/ML
IRON SATN MFR SERPL: 29 %
IRON SERPL-MCNC: 96 UG/DL
TIBC SERPL-MCNC: 324 UG/DL
UIBC SERPL-MCNC: 229 UG/DL
VIT B12 SERPL-MCNC: 281 PG/ML

## 2023-04-07 ENCOUNTER — OUTPATIENT (OUTPATIENT)
Dept: OUTPATIENT SERVICES | Facility: HOSPITAL | Age: 68
LOS: 1 days | End: 2023-04-07
Payer: MEDICARE

## 2023-04-07 ENCOUNTER — APPOINTMENT (OUTPATIENT)
Dept: CV DIAGNOSITCS | Facility: HOSPITAL | Age: 68
End: 2023-04-07

## 2023-04-07 DIAGNOSIS — I10 ESSENTIAL (PRIMARY) HYPERTENSION: ICD-10-CM

## 2023-04-07 PROCEDURE — 93325 DOPPLER ECHO COLOR FLOW MAPG: CPT | Mod: 26

## 2023-04-07 PROCEDURE — 93320 DOPPLER ECHO COMPLETE: CPT | Mod: 26

## 2023-04-07 PROCEDURE — 78494 HEART IMAGE SPECT: CPT | Mod: 26

## 2023-04-07 PROCEDURE — 93320 DOPPLER ECHO COMPLETE: CPT

## 2023-04-07 PROCEDURE — 78494 HEART IMAGE SPECT: CPT

## 2023-04-07 PROCEDURE — 93356 MYOCRD STRAIN IMG SPCKL TRCK: CPT

## 2023-04-07 PROCEDURE — 93350 STRESS TTE ONLY: CPT | Mod: 26

## 2023-04-07 PROCEDURE — 93325 DOPPLER ECHO COLOR FLOW MAPG: CPT

## 2023-04-11 ENCOUNTER — APPOINTMENT (OUTPATIENT)
Dept: CARDIOLOGY | Facility: CLINIC | Age: 68
End: 2023-04-11
Payer: MEDICARE

## 2023-04-11 VITALS
BODY MASS INDEX: 26.22 KG/M2 | HEIGHT: 69 IN | HEART RATE: 68 BPM | OXYGEN SATURATION: 99 % | WEIGHT: 177 LBS | SYSTOLIC BLOOD PRESSURE: 155 MMHG | DIASTOLIC BLOOD PRESSURE: 77 MMHG

## 2023-04-11 PROCEDURE — 99213 OFFICE O/P EST LOW 20 MIN: CPT | Mod: 25

## 2023-04-11 PROCEDURE — 93000 ELECTROCARDIOGRAM COMPLETE: CPT

## 2023-04-11 NOTE — HISTORY OF PRESENT ILLNESS
[FreeTextEntry1] : 68 year old with a history of hypertension and hyperlipidemia.  No cardiac disease and had a stress test 3 years ago which was fine.  He has no chest pain but can walk 2 miles without issues.  The only issue has been that he has herniated disk.  Has been following a diet.  No SHAUN< PND or orthopnea.  Discussed his stress echo results with patient.

## 2023-04-11 NOTE — DISCUSSION/SUMMARY
[FreeTextEntry1] : 1.  htn - continue valsartan.  Will increase carvedilol and stop amlodipine.  Told him to cut salt intake by 50%\par \par 2.  hyperlipidemia -  continue atorvastatin.  discussed low cholesterol diet\par \par 3.  stress test results normal.  Discussed results with patient.

## 2023-05-01 ENCOUNTER — APPOINTMENT (OUTPATIENT)
Dept: INTERNAL MEDICINE | Facility: CLINIC | Age: 68
End: 2023-05-01
Payer: MEDICARE

## 2023-05-01 VITALS
SYSTOLIC BLOOD PRESSURE: 150 MMHG | BODY MASS INDEX: 27.55 KG/M2 | OXYGEN SATURATION: 98 % | TEMPERATURE: 97.8 F | HEIGHT: 69 IN | HEART RATE: 66 BPM | WEIGHT: 186 LBS | RESPIRATION RATE: 14 BRPM | DIASTOLIC BLOOD PRESSURE: 90 MMHG

## 2023-05-01 DIAGNOSIS — R42 DIZZINESS AND GIDDINESS: ICD-10-CM

## 2023-05-01 PROCEDURE — 99214 OFFICE O/P EST MOD 30 MIN: CPT

## 2023-05-01 NOTE — PLAN
[FreeTextEntry1] : continue medications \par sent for MRI of head \par Pt may need to see neurologist \par Advised to lose weight \par Pt may need to see ENT\par

## 2023-05-01 NOTE — HEALTH RISK ASSESSMENT
[Yes] : Yes [2 - 3 times a week (3 pts)] : 2 - 3  times a week (3 points) [1 or 2 (0 pts)] : 1 or 2 (0 points) [Never (0 pts)] : Never (0 points) [No] : In the past 12 months have you used drugs other than those required for medical reasons? No [No falls in past year] : Patient reported no falls in the past year [0] : 2) Feeling down, depressed, or hopeless: Not at all (0) [PHQ-2 Negative - No further assessment needed] : PHQ-2 Negative - No further assessment needed [Never] : Never [[id number]] : 0

## 2023-05-01 NOTE — END OF VISIT
[FreeTextEntry3] : "I, Ammy Sanchez, personally scribed the services dictated to me by Dr. Willi Marshall MD in this documentation on 05/01/2023 " \par \par "I Dr. Willi Marshall MD, personally performed the services described in this documentation on 05/01/2023 for the patient as scribed by Ammy Sanchez in my presence. I have reviewed and verified that all the information is accurate and true."

## 2023-05-01 NOTE — HISTORY OF PRESENT ILLNESS
[FreeTextEntry8] : JORDAN OLIVERA is a 68 year old M who presents today for dizziness which started 6 weeks ago. Pt went to the ER and had CT scans of the head. CT dizziness last for minutes associated with head tightness has HTN

## 2023-05-01 NOTE — PHYSICAL EXAM
[No Acute Distress] : no acute distress [Well Nourished] : well nourished [Well Developed] : well developed [Well-Appearing] : well-appearing [Normal Voice/Communication] : normal voice/communication [Normal Sclera/Conjunctiva] : normal sclera/conjunctiva [PERRL] : pupils equal round and reactive to light [EOMI] : extraocular movements intact [Normal Outer Ear/Nose] : the outer ears and nose were normal in appearance [Normal Oropharynx] : the oropharynx was normal [No JVD] : no jugular venous distention [No Lymphadenopathy] : no lymphadenopathy [Supple] : supple [Thyroid Normal, No Nodules] : the thyroid was normal and there were no nodules present [No Respiratory Distress] : no respiratory distress  [No Accessory Muscle Use] : no accessory muscle use [Clear to Auscultation] : lungs were clear to auscultation bilaterally [Normal Rate] : normal rate  [Regular Rhythm] : with a regular rhythm [Normal S1, S2] : normal S1 and S2 [No Murmur] : no murmur heard [No Carotid Bruits] : no carotid bruits [No Abdominal Bruit] : a ~M bruit was not heard ~T in the abdomen [No Varicosities] : no varicosities [Pedal Pulses Present] : the pedal pulses are present [No Edema] : there was no peripheral edema [No Palpable Aorta] : no palpable aorta [No Extremity Clubbing/Cyanosis] : no extremity clubbing/cyanosis [Soft] : abdomen soft [Non Tender] : non-tender [Non-distended] : non-distended [No Masses] : no abdominal mass palpated [No HSM] : no HSM [Normal Bowel Sounds] : normal bowel sounds [Normal Supraclavicular Nodes] : no supraclavicular lymphadenopathy [Normal Posterior Cervical Nodes] : no posterior cervical lymphadenopathy [Normal Anterior Cervical Nodes] : no anterior cervical lymphadenopathy [No CVA Tenderness] : no CVA  tenderness [No Spinal Tenderness] : no spinal tenderness [No Joint Swelling] : no joint swelling [Grossly Normal Strength/Tone] : grossly normal strength/tone [No Rash] : no rash [Coordination Grossly Intact] : coordination grossly intact [No Focal Deficits] : no focal deficits [Normal Gait] : normal gait [Deep Tendon Reflexes (DTR)] : deep tendon reflexes were 2+ and symmetric [Speech Grossly Normal] : speech grossly normal [Memory Grossly Normal] : memory grossly normal [Normal Affect] : the affect was normal [Alert and Oriented x3] : oriented to person, place, and time [Normal Mood] : the mood was normal [Normal Insight/Judgement] : insight and judgment were intact [de-identified] : no nystagmus, no ptosis

## 2023-05-03 ENCOUNTER — RX RENEWAL (OUTPATIENT)
Age: 68
End: 2023-05-03

## 2023-05-19 ENCOUNTER — APPOINTMENT (OUTPATIENT)
Dept: MRI IMAGING | Facility: CLINIC | Age: 68
End: 2023-05-19
Payer: MEDICARE

## 2023-05-19 ENCOUNTER — OUTPATIENT (OUTPATIENT)
Dept: OUTPATIENT SERVICES | Facility: HOSPITAL | Age: 68
LOS: 1 days | End: 2023-05-19
Payer: MEDICARE

## 2023-05-19 DIAGNOSIS — R42 DIZZINESS AND GIDDINESS: ICD-10-CM

## 2023-05-19 PROCEDURE — 70551 MRI BRAIN STEM W/O DYE: CPT | Mod: 26

## 2023-05-19 PROCEDURE — 70551 MRI BRAIN STEM W/O DYE: CPT

## 2023-06-01 ENCOUNTER — NON-APPOINTMENT (OUTPATIENT)
Age: 68
End: 2023-06-01

## 2023-06-01 ENCOUNTER — APPOINTMENT (OUTPATIENT)
Dept: OTOLARYNGOLOGY | Facility: CLINIC | Age: 68
End: 2023-06-01
Payer: MEDICARE

## 2023-06-01 ENCOUNTER — EMERGENCY (EMERGENCY)
Facility: HOSPITAL | Age: 68
LOS: 1 days | Discharge: HOME | End: 2023-06-01
Attending: EMERGENCY MEDICINE | Admitting: EMERGENCY MEDICINE
Payer: MEDICARE

## 2023-06-01 VITALS
HEART RATE: 76 BPM | WEIGHT: 178 LBS | DIASTOLIC BLOOD PRESSURE: 86 MMHG | HEIGHT: 69 IN | SYSTOLIC BLOOD PRESSURE: 173 MMHG | BODY MASS INDEX: 26.36 KG/M2

## 2023-06-01 VITALS
WEIGHT: 177.91 LBS | TEMPERATURE: 98 F | DIASTOLIC BLOOD PRESSURE: 87 MMHG | HEIGHT: 69 IN | SYSTOLIC BLOOD PRESSURE: 185 MMHG | RESPIRATION RATE: 16 BRPM | HEART RATE: 76 BPM | OXYGEN SATURATION: 100 %

## 2023-06-01 VITALS
RESPIRATION RATE: 16 BRPM | SYSTOLIC BLOOD PRESSURE: 173 MMHG | DIASTOLIC BLOOD PRESSURE: 84 MMHG | OXYGEN SATURATION: 100 % | HEART RATE: 72 BPM

## 2023-06-01 VITALS — SYSTOLIC BLOOD PRESSURE: 187 MMHG | DIASTOLIC BLOOD PRESSURE: 86 MMHG

## 2023-06-01 DIAGNOSIS — H81.10 BENIGN PAROXYSMAL VERTIGO, UNSPECIFIED EAR: ICD-10-CM

## 2023-06-01 DIAGNOSIS — H90.3 SENSORINEURAL HEARING LOSS, BILATERAL: ICD-10-CM

## 2023-06-01 PROCEDURE — 99282 EMERGENCY DEPT VISIT SF MDM: CPT

## 2023-06-01 PROCEDURE — 99283 EMERGENCY DEPT VISIT LOW MDM: CPT

## 2023-06-01 PROCEDURE — 92557 COMPREHENSIVE HEARING TEST: CPT

## 2023-06-01 PROCEDURE — 92550 TYMPANOMETRY & REFLEX THRESH: CPT

## 2023-06-01 PROCEDURE — 99204 OFFICE O/P NEW MOD 45 MIN: CPT

## 2023-06-01 NOTE — ED PROVIDER NOTE - OBJECTIVE STATEMENT
Patient is a 68-year-old male with past medical history of hypertension coming in for elevated blood pressure.  Patient states he had an appoint with the ENT was having some hearing test done and noted to have elevated blood pressure 187/80.  Patient told to either go to ER or go home and recheck.  Patient went home and rechecked it was still elevated so came to emergency room.  Patient denies any chest pain shortness of breath dizziness numbness tingling weakness blurry vision.  Patient's admits he did miss a couple doses of his blood pressure medications the last few days but did take today.  Patient also admits he has been eating more salty food due to recent weddings he has been attending this month.  He saw Dr. Edwards cardiologist 2 months ago and work-up was negative.

## 2023-06-01 NOTE — ED ADULT NURSE NOTE - PRIMARY CARE PROVIDER
Make follow up with Cardiology. Tips to Help You Stop Smoking       Cigarette smoking is a preventable cause of death in the [place]. If you have thought about quitting but haven't been able to, here are some reasons why you should and some ways to do it. Here's Why   Quitting smoking now can decrease your risk of getting smoking-related illnesses like:   Heart disease   Stroke   Several types of cancer, including:   Lung   Mouth   Esophagus   Larynx   Bladder   Pancreas   Kidney   Chronic lung diseases:   Bronchitis   Emphysema   Asthma   Cataracts   Macular degeneration   Thyroid conditions   Hearing loss   Erectile dysfunction   Dementia   Osteoporosis   Here's How   Once you've decided to quit smoking, set your target quit date a few weeks away. In the time leading up to your quit day, try some of these ideas offered by the [address] to help you successfully quit smoking. For the best results, work with your doctor. Together, you can test your lung function and compare the results to those of a nonsmoking person. The results can be given to you as your lung age. Finding out your lung age right after having the test done may help you to stop smoking. Your doctor can also discuss with you all of your options and refer you to smoking-cessation support groups. You may wish to use nicotine replacement (gum, patches, inhaler) or one of the prescription medications that have been shown to increase quit rates and prolong abstinence from smoking. But whatever you and your doctor decide on these matters, it will still be you who decides when an how to quit. Here are some techniques:   Switch Brands   Switch to a brand you find distasteful. Change to a brand that is low in tar and nicotine a couple of weeks before your target quit date. This will help change your smoking behavior.  However, do not smoke more cigarettes, inhale them more often or more circumstances that aren't especially pleasurable for you. If you like to smoke with others, smoke alone. Turn your chair to an empty corner and focus only on the cigarette you are smoking and all its many negative effects. Collect all your cigarette butts in one large glass container as a visual reminder of the filth made by smoking. Just Before Quitting   Practice going without cigarettes. Don't think of never smoking again. Think of quitting in terms of one day at a time . Tell yourself you won't smoke today, and then don't. Clean your clothes to rid them of the cigarette smell, which can linger a long time. On the Day You Quit   Throw away all your cigarettes and matches. Hide your lighters and ashtrays. Visit the dentist and have your teeth cleaned to get rid of tobacco stains. Notice how nice they look and resolve to keep them that way. Make a list of things you'd like to buy for yourself or someone else. Estimate the cost in terms of packs of cigarettes, and put the money aside to buy these presents. Keep very busy on the big day. Go to the movies, exercise, take long walks, or go bike riding. Remind your family and friends that this is your quit date, and ask them to help you over the rough spots of the first couple of days and weeks. Buy yourself a treat or do something special to celebrate. Telephone and Internet Support   Telephone, web-, and computer-based programs can offer you the support that you need to quit and to stay smoke-free. You can find many programs online, like the [employer] Association's [place] from Smoking . Immediately After Quitting   Develop a clean, fresh, nonsmoking environment around yourselfat work and at home. Buy yourself flowersyou may be surprised how much you can enjoy their scent now.    The first few days after you quit, spend as much free time as possible in places where smoking isn't allowed, such as [address] [place] [address], museums, theaters, department stores, and churches. Drink large quantities of water and fruit juice (but avoid sodas that contain caffeine). Try to avoid alcohol, coffee, and other beverages that you associate with cigarette smoking. Strike up conversation instead of a match for a cigarette. If you miss the sensation of having a cigarette in your hand, play with something elsea pencil, a paper clip, a marble. If you miss having something in your mouth, try toothpicks or a fake cigarette. Dr Herron

## 2023-06-01 NOTE — ED PROVIDER NOTE - NSFOLLOWUPINSTRUCTIONS_ED_ALL_ED_FT
Follow up with pcp and cardiology  return to er for any worsening symptoms     Hypertension, Adult  High blood pressure (hypertension) is when the force of blood pumping through the arteries is too strong. The arteries are the blood vessels that carry blood from the heart throughout the body. Hypertension forces the heart to work harder to pump blood and may cause arteries to become narrow or stiff. Untreated or uncontrolled hypertension can lead to a heart attack, heart failure, a stroke, kidney disease, and other problems.    A blood pressure reading consists of a higher number over a lower number. Ideally, your blood pressure should be below 120/80. The first ("top") number is called the systolic pressure. It is a measure of the pressure in your arteries as your heart beats. The second ("bottom") number is called the diastolic pressure. It is a measure of the pressure in your arteries as the heart relaxes.    What are the causes?  The exact cause of this condition is not known. There are some conditions that result in high blood pressure.    What increases the risk?  Certain factors may make you more likely to develop high blood pressure. Some of these risk factors are under your control, including:  Smoking.  Not getting enough exercise or physical activity.  Being overweight.  Having too much fat, sugar, calories, or salt (sodium) in your diet.  Drinking too much alcohol.  Other risk factors include:  Having a personal history of heart disease, diabetes, high cholesterol, or kidney disease.  Stress.  Having a family history of high blood pressure and high cholesterol.  Having obstructive sleep apnea.  Age. The risk increases with age.  What are the signs or symptoms?  High blood pressure may not cause symptoms. Very high blood pressure (hypertensive crisis) may cause:  Headache.  Fast or irregular heartbeats (palpitations).  Shortness of breath.  Nosebleed.  Nausea and vomiting.  Vision changes.  Severe chest pain, dizziness, and seizures.  How is this diagnosed?  This condition is diagnosed by measuring your blood pressure while you are seated, with your arm resting on a flat surface, your legs uncrossed, and your feet flat on the floor. The cuff of the blood pressure monitor will be placed directly against the skin of your upper arm at the level of your heart. Blood pressure should be measured at least twice using the same arm. Certain conditions can cause a difference in blood pressure between your right and left arms.    If you have a high blood pressure reading during one visit or you have normal blood pressure with other risk factors, you may be asked to:  Return on a different day to have your blood pressure checked again.  Monitor your blood pressure at home for 1 week or longer.  If you are diagnosed with hypertension, you may have other blood or imaging tests to help your health care provider understand your overall risk for other conditions.    How is this treated?  This condition is treated by making healthy lifestyle changes, such as eating healthy foods, exercising more, and reducing your alcohol intake. You may be referred for counseling on a healthy diet and physical activity.    Your health care provider may prescribe medicine if lifestyle changes are not enough to get your blood pressure under control and if:  Your systolic blood pressure is above 130.  Your diastolic blood pressure is above 80.  Your personal target blood pressure may vary depending on your medical conditions, your age, and other factors.    Follow these instructions at home:  Eating and drinking    A plate with examples of foods in a healthy diet.  Eat a diet that is high in fiber and potassium, and low in sodium, added sugar, and fat. An example of this eating plan is called the DASH diet. DASH stands for Dietary Approaches to Stop Hypertension. To eat this way:  Eat plenty of fresh fruits and vegetables. Try to fill one half of your plate at each meal with fruits and vegetables.  Eat whole grains, such as whole-wheat pasta, brown rice, or whole-grain bread. Fill about one fourth of your plate with whole grains.  Eat or drink low-fat dairy products, such as skim milk or low-fat yogurt.  Avoid fatty cuts of meat, processed or cured meats, and poultry with skin. Fill about one fourth of your plate with lean proteins, such as fish, chicken without skin, beans, eggs, or tofu.  Avoid pre-made and processed foods. These tend to be higher in sodium, added sugar, and fat.  Reduce your daily sodium intake. Many people with hypertension should eat less than 1,500 mg of sodium a day.  Do not drink alcohol if:  Your health care provider tells you not to drink.  You are pregnant, may be pregnant, or are planning to become pregnant.  If you drink alcohol:  Limit how much you have to:  0–1 drink a day for women.  0–2 drinks a day for men.  Know how much alcohol is in your drink. In the [place] one drink equals one 12 oz bottle of beer (355 mL), one 5 oz glass of wine (148 mL), or one 1½ oz glass of hard liquor (44 mL).  Lifestyle    A blood pressure monitor and cuff.   Work with your health care provider to maintain a healthy body weight or to lose weight. Ask what an ideal weight is for you.  Get at least 30 minutes of exercise that causes your heart to beat faster (aerobic exercise) most days of the week. Activities may include walking, swimming, or biking.  Include exercise to strengthen your muscles (resistance exercise), such as Pilates or lifting weights, as part of your weekly exercise routine. Try to do these types of exercises for 30 minutes at least 3 days a week.  Do not use any products that contain nicotine or tobacco. These products include cigarettes, chewing tobacco, and vaping devices, such as e-cigarettes. If you need help quitting, ask your health care provider.  Monitor your blood pressure at home as told by your health care provider.  Keep all follow-up visits. This is important.  Medicines    Take over-the-counter and prescription medicines only as told by your health care provider. Follow directions carefully. Blood pressure medicines must be taken as prescribed.  Do not skip doses of blood pressure medicine. Doing this puts you at risk for problems and can make the medicine less effective.  Ask your health care provider about side effects or reactions to medicines that you should watch for.  Contact a health care provider if you:  Think you are having a reaction to a medicine you are taking.  Have headaches that keep coming back (recurring).  Feel dizzy.  Have swelling in your ankles.  Have trouble with your vision.  Get help right away if you:  Develop a severe headache or confusion.  Have unusual weakness or numbness.  Feel faint.  Have severe pain in your chest or abdomen.  Vomit repeatedly.  Have trouble breathing.  These symptoms may be an emergency. Get help right away. Call 911.  Do not wait to see if the symptoms will go away.  Do not drive yourself to the hospital.  Summary  Hypertension is when the force of blood pumping through your arteries is too strong. If this condition is not controlled, it may put you at risk for serious complications.  Your personal target blood pressure may vary depending on your medical conditions, your age, and other factors. For most people, a normal blood pressure is less than 120/80.  Hypertension is treated with lifestyle changes, medicines, or a combination of both. Lifestyle changes include losing weight, eating a healthy, low-sodium diet, exercising more, and limiting alcohol.  This information is not intended to replace advice given to you by your health care provider. Make sure you discuss any questions you have with your health care provider.

## 2023-06-01 NOTE — DATA REVIEWED
[de-identified] : CT head, CTA head, CTA neck 3/13/23:\par INTERPRETATION:  Examinations were performed on this patient:\par 1. CT Angiography of the carotid arteries with IV contrast\par 2. CT Angiography of the intracranial circulation with IV contrast\par 3. CT Head without contrast\par \par \par CLINICAL INFORMATION:  Carotid stenosis. Intracranial stenosis/aneurysm. \par TIA/stroke.\par \par TECHNIQUE:   Preceding intravenous contrast contiguous axial 4 mm \par sections were obtained through the head. CT angiography images at .5 mm \par thickness were acquired from the aortic arch to the vertex of the skull.  \par  Images were acquired during rapid bolus intravenous administration of 90 \par mL of Omnipaque 350 contrast/ 10 mL discarded.  This data set was \par reconstructed axial 1 mm sections. Post processing maximum intensity \par projection angiographic reconstruction of images was performed.  This \par data set was reconstructed and reviewed in 2 mm sagittal and coronal \par reformatted images to evaluate carotid morphology and intracranial \par vessels.   The magnitude of stenosis was determined using NASCET \par criteria.   This scan was performed using automatic exposure control \par (radiation dose reduction software) to obtain a diagnostic image quality \par scan with patient dose as low as reasonably achievable.\par \par FINDINGS:   No previous examinations are available for review.\par \par The RIGHT lateral carotid bifurcations are intact without hemodynamically \par significant stenosis. Minimal calcified plaque is seen at the proximal \par LEFT internal carotid artery. The vertebral arteries are patent.\par \par The intracranial circulation is intact without aneurysm, vascular \par malformation or abnormal vessel termination.\par \par The brain demonstrates no abnormal enhancement, attenuation or \par mass-effect.  No acute cerebral cortical infarct is seen.  No \par intracranial hemorrhage is found.  The ventricles, sulci and basal \par cisterns appear unremarkable.\par \par The orbits are unremarkable.  The paranasal sinuses are clear.  The nasal \par cavity remains intact.  The nasopharynx is symmetric.  The central skull \par base, petrous temporal bones and calvarium remain intact.\par \par \par \par IMPRESSION:\par \par     1.   Right carotid system:  No hemodynamically significant stenosis.\par \par     2.   Left carotid system:  No hemodynamically significant stenosis.\par \par     3.   Intracranial circulation:  No significant vascular lesion.\par \par     4.   Brain:  No significant lesion identified.\par \par \par MRI head 5/2023:\par FINDINGS:\par \par Ventricles and sulci are mildly proportionately prominent likely related to mild parenchymal volume loss. . No hydrocephalus or extra-axial fluid collection.\par \par No abnormal restricted diffusion identified to suggest acute territorial infarction. No acute intracranial hemorrhage. Mild subcortical and periventricular-white matter T2-weighted hyperintensity, nonspecific but favored to reflect sequela of mild chronic microvascular ischemia. . No significant midline shift, mass effect or herniation.\par \par Visualized proximal arterial and dural venous sinus flow voids preserved on spin-echo sequences. Mastoid air cells are well aerated.. Mild mucosal thickening of anterior ethmoid air cells and maxillary sinuses.\par \par No suspicious expansile or destructive calvarial lesion identified.\par \par Sella and suprasellar region are unremarkable.\par \par No space-occupying lesion, abnormal signal identified within the internal auditory canals and bilateral cerebellopontine angle cisterns.\par Inner ear structures demonstrate normal T2-weighted signal bilaterally. Meckel caves and cavernous sinuses are within normal limits.\par \par \par IMPRESSION:\par \par No acute intracranial hemorrhage, acute infarction, extra-axial fluid collection or hydrocephalus.\par No space-occupying lesion, no abnormal signal identified within the internal auditory canals and bilateral cerebellopontine angle cisterns.\par  [de-identified] : Type A tymps AU\par normal to mild SNHL, 250-6000 Hz, mod 8KHz, AU\par REC: ENT f/u, re-eval per MD

## 2023-06-01 NOTE — CONSULT LETTER
[Dear  ___] : Dear  [unfilled], [Consult Letter:] : I had the pleasure of evaluating your patient, [unfilled]. [Please see my note below.] : Please see my note below. [Consult Closing:] : Thank you very much for allowing me to participate in the care of this patient.  If you have any questions, please do not hesitate to contact me. [Sincerely,] : Sincerely, [FreeTextEntry3] : Joel Coyle M.D.

## 2023-06-01 NOTE — ED PROVIDER NOTE - PATIENT PORTAL LINK FT
You can access the FollowMyHealth Patient Portal offered by [facility] by registering at the following website: [email or web address]. By joining [employer]’s FollowMyHealth portal, you will also be able to view your health information using other applications (apps) compatible with our system.

## 2023-06-01 NOTE — REASON FOR VISIT
[Initial Consultation] : an initial consultation for [FreeTextEntry2] : Possible vertigo and tinnitus

## 2023-06-01 NOTE — ED PROVIDER NOTE - CLINICAL SUMMARY MEDICAL DECISION MAKING FREE TEXT BOX
68-year-old male with a history of hypertension presents with came to the ER today due to hypertension.  Patient had a routine appointment this morning with ENT for hearing test.  Patient was told his blood pressure was around 180/80.  Patient was told to follow-up with his doctor, or he could always go to the emergency room to get rechecked.  Patient states recently he was seen by his cardiologist and had his blood pressure medications reduced, patient had been doing well with his salt intake and was able to have his medications reduced.  However patient has had several parties recently, also has missed a couple doses of his blood pressure medication over the past several days.  Patient missed his last dose yesterday evening, however he took his medications this morning.  Patient has otherwise been asymptomatic.  No chest pain or shortness of breath.  No acute palpitations.  No acute headache.  No nausea or vomiting.  No visual changes.  No neck pain or stiffness.  No numbness/tingling/focal weakness.  No aggravating or alleviating factors otherwise noted.  No other acute complaints.  Exam: Nontoxic, well-appearing.  CTA BL, no W/R/R.  Normal cardiac exam.  Neck supple.  No meningeal signs.  Normal nonfocal detailed neurologic exam.  Abdomen soft, nontender, nondistended.  No C/C/E.  Abdomen soft, nontender, nondistended.  No other acute findings on exam.  Asymptomatic hypertension, around 180 systolic now.  Patient has missed a few doses recently.  Discussed with patient at length regarding the nature of his hypertension, and the fact that he is asymptomatic.  Patient will resume his usual doses, will follow-up with his cardiologist/primary care doctor in the next several days for definitive management, possible change in his medications.  Patient agreed with discharge, will follow-up as soon as possible.

## 2023-06-01 NOTE — HISTORY OF PRESENT ILLNESS
[de-identified] : Raffi June is a 69 yo male with hx HTN who was referred by Dr. Marshall for evaluation of dizziness. He states that in March 2023, he had an episode of vertigo. This lasted for a few minutes and occurred with getting out of the bed. He went to the ED. He had CT head, CTA head/neck, and MRI brain which were all normal. He notes only episode of feeling slightly off balance since then. Prior to this, he has never had vertigo. He notes chronic bilateral nonpulsatile tinnitus. He denies hearing change. He denies otalgia or otorrhea. He notes intermittent aural fullness. He denies recent fevers, chills, or recurrent ear infections .He dneies facial  weakness or facail numbness. He denies weakness/numbness of extremities. He denies family history of early onset hearing loss or significant noise exposure. He denies exposure to ototoxic medications or head trauma.

## 2023-06-01 NOTE — ED ADULT NURSE NOTE - NURSING MUSC STRENGTH
Thank you for choosing the [facility] [place], Charlie Coffey MD, and our team as your Neuro Surgery Specialists.  We actively use feedback to constantly improve and deliver the best care possible. To provide the best experience we are collecting feedback from you on how we performed.  You may receive a survey in the mail to evaluate how we did. Please take a moment and share your thoughts.      If for any reason you feel that we did not meet your expectations or you want to share a positive experience, please give us a call. Your feedback helps us know how we are doing and what we can be doing better.    If your provider has ordered imaging for you to complete please call our pre-service department at [phone] to schedule.    Office hours: 8:00 am to 4:30 pm, Monday - Friday  Phone: [phone]   hand grasp, leg strength strong and equal bilaterally

## 2023-06-01 NOTE — ASSESSMENT
[FreeTextEntry1] : Raffi June presnets for evaluation of vertigo that occurred in Mar 2023. CT head, CTA head/neck, MRI head, ED notes, and PCP notes were reviewed. There was no causative mass or lesion, or evidence of CVA. Based on his history, it seems that he had benign paroxysmal positional vertigo that has resolved. He notes chronic tinnitus. Otoscopic exam was normal. Audiogram was performed and reviewed showing type A tymps AU and normal to mild to moderate SNHL AU. Will observe for now.\par \par - Follow up in 1 year with audio. If vertigo recurs, he will follow up sooner.\par \par

## 2023-06-01 NOTE — ED ADULT NURSE NOTE - CAPILLARY REFILL
Continue asa/statin  -pt not on beta blocker   -EKG w/o ischemic changes. Negative troponin x 1     2 seconds or less

## 2023-06-01 NOTE — REVIEW OF SYSTEMS
[Seasonal Allergies] : seasonal allergies [Ear Pain] : ear pain [Vertigo] : vertigo [Negative] : Heme/Lymph

## 2023-06-01 NOTE — ED ADULT NURSE NOTE - CHIEF COMPLAINT QUOTE
Called pt to see if it would be ok to do a telephone visit for his 4/16/2020 appointment.  He stated that it would be fine. He did mention that he wanted to discuss chest pain that he gets with exertion but that a phone visit was ok.   " My blood pressure is high, did not take my BP meds x 2 days, took it this am "

## 2023-06-08 ENCOUNTER — RX RENEWAL (OUTPATIENT)
Age: 68
End: 2023-06-08

## 2023-06-11 ENCOUNTER — RX RENEWAL (OUTPATIENT)
Age: 68
End: 2023-06-11

## 2023-07-07 ENCOUNTER — APPOINTMENT (OUTPATIENT)
Dept: COLORECTAL SURGERY | Facility: CLINIC | Age: 68
End: 2023-07-07
Payer: MEDICARE

## 2023-07-07 DIAGNOSIS — D64.9 ANEMIA, UNSPECIFIED: ICD-10-CM

## 2023-07-07 PROCEDURE — 99443: CPT

## 2023-07-09 PROBLEM — D64.9 ANEMIA: Status: ACTIVE | Noted: 2023-04-04

## 2023-07-13 ENCOUNTER — NON-APPOINTMENT (OUTPATIENT)
Age: 68
End: 2023-07-13

## 2023-08-21 ENCOUNTER — APPOINTMENT (OUTPATIENT)
Dept: SURGERY | Facility: CLINIC | Age: 68
End: 2023-08-21
Payer: MEDICARE

## 2023-08-21 VITALS
WEIGHT: 178 LBS | DIASTOLIC BLOOD PRESSURE: 83 MMHG | HEIGHT: 69 IN | SYSTOLIC BLOOD PRESSURE: 155 MMHG | BODY MASS INDEX: 26.36 KG/M2 | TEMPERATURE: 97.4 F | HEART RATE: 61 BPM

## 2023-08-21 PROCEDURE — 99213 OFFICE O/P EST LOW 20 MIN: CPT

## 2023-10-17 ENCOUNTER — APPOINTMENT (OUTPATIENT)
Dept: INTERNAL MEDICINE | Facility: CLINIC | Age: 68
End: 2023-10-17
Payer: MEDICARE

## 2023-10-17 VITALS
RESPIRATION RATE: 14 BRPM | HEIGHT: 69 IN | HEART RATE: 68 BPM | SYSTOLIC BLOOD PRESSURE: 126 MMHG | OXYGEN SATURATION: 98 % | DIASTOLIC BLOOD PRESSURE: 74 MMHG | WEIGHT: 175 LBS | TEMPERATURE: 97.9 F | BODY MASS INDEX: 25.92 KG/M2

## 2023-10-17 DIAGNOSIS — Z23 ENCOUNTER FOR IMMUNIZATION: ICD-10-CM

## 2023-10-17 PROCEDURE — G0008: CPT

## 2023-10-17 PROCEDURE — 90662 IIV NO PRSV INCREASED AG IM: CPT

## 2023-10-17 PROCEDURE — 99214 OFFICE O/P EST MOD 30 MIN: CPT | Mod: 25

## 2023-10-29 DIAGNOSIS — Z12.5 ENCOUNTER FOR SCREENING FOR MALIGNANT NEOPLASM OF PROSTATE: ICD-10-CM

## 2023-11-14 LAB — PSA SERPL-MCNC: 2.94 NG/ML

## 2023-12-04 LAB
APPEARANCE: CLEAR
BACTERIA UR CULT: NORMAL
BACTERIA: NEGATIVE /HPF
BILIRUBIN URINE: NEGATIVE
BLOOD URINE: NEGATIVE
CAST: 0 /LPF
COLOR: YELLOW
EPITHELIAL CELLS: 0 /HPF
GLUCOSE QUALITATIVE U: NEGATIVE MG/DL
KETONES URINE: NEGATIVE MG/DL
LEUKOCYTE ESTERASE URINE: NEGATIVE
MICROSCOPIC-UA: NORMAL
NITRITE URINE: NEGATIVE
PH URINE: 6.5
PROTEIN URINE: NEGATIVE MG/DL
RED BLOOD CELLS URINE: 1 /HPF
SPECIFIC GRAVITY URINE: 1.02
UROBILINOGEN URINE: 0.2 MG/DL
WHITE BLOOD CELLS URINE: 0 /HPF

## 2023-12-06 ENCOUNTER — APPOINTMENT (OUTPATIENT)
Dept: COLORECTAL SURGERY | Facility: AMBULATORY MEDICAL SERVICES | Age: 68
End: 2023-12-06
Payer: MEDICARE

## 2023-12-06 PROCEDURE — 45378 DIAGNOSTIC COLONOSCOPY: CPT

## 2023-12-13 ENCOUNTER — APPOINTMENT (OUTPATIENT)
Dept: UROLOGY | Facility: CLINIC | Age: 68
End: 2023-12-13
Payer: MEDICARE

## 2023-12-13 DIAGNOSIS — N13.8 BENIGN PROSTATIC HYPERPLASIA WITH LOWER URINARY TRACT SYMPMS: ICD-10-CM

## 2023-12-13 DIAGNOSIS — N52.9 MALE ERECTILE DYSFUNCTION, UNSPECIFIED: ICD-10-CM

## 2023-12-13 DIAGNOSIS — R97.20 ELEVATED PROSTATE, SPECIFIC ANTIGEN [PSA]: ICD-10-CM

## 2023-12-13 DIAGNOSIS — N40.1 BENIGN PROSTATIC HYPERPLASIA WITH LOWER URINARY TRACT SYMPMS: ICD-10-CM

## 2023-12-13 PROCEDURE — 99214 OFFICE O/P EST MOD 30 MIN: CPT | Mod: 25

## 2023-12-13 PROCEDURE — 51741 ELECTRO-UROFLOWMETRY FIRST: CPT

## 2023-12-13 NOTE — REASON FOR VISIT
[FreeTextEntry1] : This is a 37 year old male with severe aplastic anemia admitted for a haplo-identical bone marrow from his sister (6/12) with Flu / Cy / TBI prep regimen. Hematologic history as follows: initially diagnosed by bone marrow \par biopsy 4/20, began treatment 4/23/20 with horse ATG + CSA + promacta and achieved CR1. A bone marrow biopsy 11/9/20 showed hypocellular marrow, with overall improved cellularity showing marrow regeneration. Bone marrow biopsy 5/18/21 showed normal morphology and normal cellularity. He relapsed 11/2021, developing worsening cytopenias and neutropenic fevers. He was then treated with rabbit ATG, prednisone, CSA and promacta with partial response 12/27/21. He later developed worsening hemolysis and pancytopenia, with a rising PNH clone and was treated with Ravulizumab. 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 On 7/15/23, decrease cellcept to 1000 mg daily. \par Reviewed signs and symptoms of GVHD.\par \par 7/5/23 CMV PCR negative. Continue to monitor weekly. \par \par 4) GI\par Continue ppx:\par ondansetron 8 mg oral tablet: 1 tab(s) orally every 8 hours as needed for nausea \par Pepcid 20 mg oral tablet: 1 tab(s) orally 2 times a day \par \par Transaminitis:\par 7/3/23: AST 47, , Alk Phos 115. Continue to monitor\par HOLD fluconazole as of 7/10/23.\par \par 5) Other\par Continue:\par magnesium oxide 400 mg oral tablet: 1 tab(s) orally 3 times a day (with meals) \par ursodiol 300 mg oral capsule: 1 cap(s) orally 2 times a day \par Eye Pain- Continue to monitor. If symptoms persist will order sinus xray.\par \par 6) Plan\par Continue weekly appointments at this time.\par Continue post transplant diet and crowd restrictions.\par NO RESTAURANT OR TAKE OUT FOOD AT THIS TIME, ONLY HOME COOKED PREPARED/FROZEN FOODS. You are allowed to have fresh baked pizza right out of the oven. This is the ONLY takeout food at this time. \par Notify your physician if bleeding; swelling; persistent nausea and vomiting; unable to urinate; pain not relieved by medications; fever; numbness, tingling; excessive diarrhea, inability to tolerate liquids or foods; increased \par irritability or sluggishness, rash \par Follow up with Dr Wilder in one week\par  [Follow-up Visit ___] : a follow-up visit  for [unfilled]

## 2023-12-31 NOTE — HISTORY OF PRESENT ILLNESS
[FreeTextEntry1] : 68-year-old male presents for follow-up Taking Flomax.  Has reasonable stream, urinates every 3-4 hours or so during the day and nocturia of 1 x.  Endorses off and on dysuria. Denies hesitancy, straining, intermittency, urgency, incontinence, sense of incomplete emptying. Denies hematuria, lower abdominal or flank pain, fever, chills or rigors. Rates erections: 3/5.  Seen on 8/17/2022 for Elevated PSA.  Denied any recent unintentional weight loss, night sweats and new bone pain. Has chronic back pain. No family history of Prostate cancer.  Reported reasonable stream, urinates every 3-4 hours or so during the day. Nocturia of 1 x.  Endorsed off and on dysuria, urgency, sense of incomplete emptying and post void dribbling.  Denied hematuria, lower abdominal or flank pain, fever, chills or rigors. Rated erections 3-4/5. Able to attain, maintain and penetrate.  Normal libido and ejaculation.   Had complained of numbness, pinched feeling in pelvic area and some dysuria in January 2022.  Had obtained urine test and Renal and Bladder Ultrasound: negative.

## 2023-12-31 NOTE — ASSESSMENT
[FreeTextEntry1] : Reviewed records. Discussed labs.   Benign Prostatic Hyperplasia: See uroflow and post void residual.  Discussed treatment options, will continue Flomax.   Erectile dysfunction: Discussed treatment options.  Wants to try Cialis/Tadalafil. Discussed proper use.  Recommended that patient start with 5 mg and take it daily.  If partial response can increase to 10 mg and up to 20 mg, to be taken as needed and not on 2 consecutive days. The patient understands that these medications are not initiators of erection and that he will still require sexual stimulation.  He was advised to take the medication 30 to 60 minutes prior to sexual activity and that the efficacy of the medication is decreased following a high-fat meal or concurrent use of alcohol.  Explained the common adverse effects of therapy including, but not limited to headache, flushing, upset stomach, nasal congestion, abnormal vision, back pain, and myalgia. Asked pt to stop taking the medicine if he develops chest pain, visual or auditory disturbance.  Explained priapism: if erection does not go down after ejaculation or lasts more than 4 hrs to present to emergency room.  Asked patient not take Flomax and Tadalafil with in 4 hours of each other as both can drop blood pressure.  Will get Total and Free Testosterone.  Rising PSA:  PSA went up from March.  Discussed treatment options.  Will continue PSA monitoring. Will repeat PSA in March 2024 with 48 hours of sexual abstinence.  Will inform results. Return to office in 1 year or sooner if any issues: will do uroflow and check post void residual.

## 2023-12-31 NOTE — PHYSICAL EXAM
[Prostate Tenderness] : the prostate was not tender [No Prostate Nodules] : no prostate nodules [Prostate Size ___ (0-4)] : prostate size [unfilled] (scale: 0-4) [Normal Appearance] : normal appearance [General Appearance - In No Acute Distress] : no acute distress [] : no respiratory distress [Abdomen Soft] : soft [Abdomen Tenderness] : non-tender [Normal Station and Gait] : the gait and station were normal for the patient's age [Oriented To Time, Place, And Person] : oriented to person, place, and time [de-identified] : normal peripheral circulation

## 2024-02-28 ENCOUNTER — RX RENEWAL (OUTPATIENT)
Age: 69
End: 2024-02-28

## 2024-02-28 RX ORDER — CARVEDILOL 25 MG/1
25 TABLET, FILM COATED ORAL
Qty: 200 | Refills: 1 | Status: ACTIVE | COMMUNITY
Start: 2024-02-28 | End: 1900-01-01

## 2024-03-07 ENCOUNTER — OUTPATIENT (OUTPATIENT)
Dept: OUTPATIENT SERVICES | Facility: HOSPITAL | Age: 69
LOS: 1 days | Discharge: HOME | End: 2024-03-07
Payer: MEDICARE

## 2024-03-07 VITALS
OXYGEN SATURATION: 99 % | TEMPERATURE: 98 F | HEIGHT: 69 IN | HEART RATE: 71 BPM | SYSTOLIC BLOOD PRESSURE: 144 MMHG | DIASTOLIC BLOOD PRESSURE: 86 MMHG | WEIGHT: 184.53 LBS | RESPIRATION RATE: 18 BRPM

## 2024-03-07 DIAGNOSIS — K40.20 BILATERAL INGUINAL HERNIA, WITHOUT OBSTRUCTION OR GANGRENE, NOT SPECIFIED AS RECURRENT: ICD-10-CM

## 2024-03-07 DIAGNOSIS — I10 ESSENTIAL (PRIMARY) HYPERTENSION: ICD-10-CM

## 2024-03-07 DIAGNOSIS — E78.5 HYPERLIPIDEMIA, UNSPECIFIED: ICD-10-CM

## 2024-03-07 DIAGNOSIS — Z01.818 ENCOUNTER FOR OTHER PREPROCEDURAL EXAMINATION: ICD-10-CM

## 2024-03-07 LAB
ANION GAP SERPL CALC-SCNC: 2 MMOL/L — LOW (ref 5–17)
BASOPHILS # BLD AUTO: 0.03 K/UL — SIGNIFICANT CHANGE UP (ref 0–0.2)
BASOPHILS NFR BLD AUTO: 0.7 % — SIGNIFICANT CHANGE UP (ref 0–2)
BUN SERPL-MCNC: 21 MG/DL — SIGNIFICANT CHANGE UP (ref 7–23)
CALCIUM SERPL-MCNC: 9.4 MG/DL — SIGNIFICANT CHANGE UP (ref 8.5–10.1)
CHLORIDE SERPL-SCNC: 112 MMOL/L — HIGH (ref 96–108)
CO2 SERPL-SCNC: 30 MMOL/L — SIGNIFICANT CHANGE UP (ref 22–31)
CREAT SERPL-MCNC: 0.9 MG/DL — SIGNIFICANT CHANGE UP (ref 0.5–1.3)
EGFR: 94 ML/MIN/1.73M2 — SIGNIFICANT CHANGE UP
EOSINOPHIL # BLD AUTO: 0.14 K/UL — SIGNIFICANT CHANGE UP (ref 0–0.5)
EOSINOPHIL NFR BLD AUTO: 3.2 % — SIGNIFICANT CHANGE UP (ref 0–6)
GLUCOSE SERPL-MCNC: 116 MG/DL — HIGH (ref 70–99)
HCT VFR BLD CALC: 38.7 % — LOW (ref 39–50)
HGB BLD-MCNC: 12.8 G/DL — LOW (ref 13–17)
IMM GRANULOCYTES NFR BLD AUTO: 0.2 % — SIGNIFICANT CHANGE UP (ref 0–0.9)
LYMPHOCYTES # BLD AUTO: 1.36 K/UL — SIGNIFICANT CHANGE UP (ref 1–3.3)
LYMPHOCYTES # BLD AUTO: 31.2 % — SIGNIFICANT CHANGE UP (ref 13–44)
MCHC RBC-ENTMCNC: 30 PG — SIGNIFICANT CHANGE UP (ref 27–34)
MCHC RBC-ENTMCNC: 33.1 G/DL — SIGNIFICANT CHANGE UP (ref 32–36)
MCV RBC AUTO: 90.6 FL — SIGNIFICANT CHANGE UP (ref 80–100)
MONOCYTES # BLD AUTO: 0.6 K/UL — SIGNIFICANT CHANGE UP (ref 0–0.9)
MONOCYTES NFR BLD AUTO: 13.8 % — SIGNIFICANT CHANGE UP (ref 2–14)
NEUTROPHILS # BLD AUTO: 2.22 K/UL — SIGNIFICANT CHANGE UP (ref 1.8–7.4)
NEUTROPHILS NFR BLD AUTO: 50.9 % — SIGNIFICANT CHANGE UP (ref 43–77)
NRBC # BLD: 0 /100 WBCS — SIGNIFICANT CHANGE UP (ref 0–0)
PLATELET # BLD AUTO: 177 K/UL — SIGNIFICANT CHANGE UP (ref 150–400)
POTASSIUM SERPL-MCNC: 4.3 MMOL/L — SIGNIFICANT CHANGE UP (ref 3.5–5.3)
POTASSIUM SERPL-SCNC: 4.3 MMOL/L — SIGNIFICANT CHANGE UP (ref 3.5–5.3)
RBC # BLD: 4.27 M/UL — SIGNIFICANT CHANGE UP (ref 4.2–5.8)
RBC # FLD: 14.3 % — SIGNIFICANT CHANGE UP (ref 10.3–14.5)
SODIUM SERPL-SCNC: 144 MMOL/L — SIGNIFICANT CHANGE UP (ref 135–145)
WBC # BLD: 4.36 K/UL — SIGNIFICANT CHANGE UP (ref 3.8–10.5)
WBC # FLD AUTO: 4.36 K/UL — SIGNIFICANT CHANGE UP (ref 3.8–10.5)

## 2024-03-07 PROCEDURE — 93010 ELECTROCARDIOGRAM REPORT: CPT

## 2024-03-07 RX ORDER — LOSARTAN POTASSIUM 100 MG/1
0 TABLET, FILM COATED ORAL
Qty: 0 | Refills: 0 | DISCHARGE

## 2024-03-07 NOTE — H&P PST ADULT - ASSESSMENT
69M PMH HTN, HLD presents to [facility] for scheduled robotic assisted lap b/l inguinal hernia repair on 3/21/24     CAPRINI SCORE [CLOT]    AGE RELATED RISK FACTORS                                                       MOBILITY RELATED FACTORS  [ ] Age 41-60 years                                            (1 Point)                  [ ] Bed rest                                                        (1 Point)  [X ] Age: 61-74 years                                           (2 Points)                 [ ] Plaster cast                                                   (2 Points)  [ ] Age= 75 years                                              (3 Points)                 [ ] Bed bound for more than 72 hours                 (2 Points)    DISEASE RELATED RISK FACTORS                                               GENDER SPECIFIC FACTORS  [ ] Edema in the lower extremities                       (1 Point)                  [ ] Pregnancy                                                     (1 Point)  [ ] Varicose veins                                               (1 Point)                  [ ] Post-partum < 6 weeks                                   (1 Point)             [ ] BMI > 25 Kg/m2                                            (1 Point)                  [ ] Hormonal therapy  or oral contraception          (1 Point)                 [ ] Sepsis (in the previous month)                        (1 Point)                  [ ] History of pregnancy complications                 (1 point)  [ ] Pneumonia or serious lung disease                                               [ ] Unexplained or recurrent                     (1 Point)           (in the previous month)                               (1 Point)  [ ] Abnormal pulmonary function test                     (1 Point)                 SURGERY RELATED RISK FACTORS  [ ] Acute myocardial infarction                              (1 Point)                 [ ]  Section                                             (1 Point)  [ ] Congestive heart failure (in the previous month)  (1 Point)               [ ] Minor surgery                                                  (1 Point)   [ ] Inflammatory bowel disease                             (1 Point)                 [ ] Arthroscopic surgery                                        (2 Points)  [ ] Central venous access                                      (2 Points)                [X ] General surgery lasting more than 45 minutes   (2 Points)       [ ] Stroke (in the previous month)                          (5 Points)               [ ] Elective arthroplasty                                         (5 Points)                                                                                                                                               HEMATOLOGY RELATED FACTORS                                                 TRAUMA RELATED RISK FACTORS  [ ] Prior episodes of VTE                                     (3 Points)                [ ] Fracture of the hip, pelvis, or leg                       (5 Points)  [ ] Positive family history for VTE                         (3 Points)                 [ ] Acute spinal cord injury (in the previous month)  (5 Points)  [ ] Prothrombin 61118 A                                     (3 Points)                 [ ] Paralysis  (less than 1 month)                             (5 Points)  [ ] Factor V Leiden                                             (3 Points)                  [ ] Multiple Trauma within 1 month                        (5 Points)  [ ] Lupus anticoagulants                                     (3 Points)                                                           [ ] Anticardiolipin antibodies                               (3 Points)                                                       [ ] High homocysteine in the blood                      (3 Points)                                             [ ] Other congenital or acquired thrombophilia      (3 Points)                                                [ ] Heparin induced thrombocytopenia                  (3 Points)                                          Total Score [       4   ]    Caprini Score 0 - 2:  Low Risk, No VTE Prophylaxis required for most patients, encourage ambulation  Caprini Score 3 - 6:  At Risk, pharmacologic VTE prophylaxis is indicated for most patients (in the absence of a contraindication)  Caprini Score Greater than or = 7:  High Risk, pharmacologic VTE prophylaxis is indicated for most patients (in the absence of a contraindication)

## 2024-03-07 NOTE — H&P PST ADULT - HISTORY OF PRESENT ILLNESS
69M PMH HTN, HLD presents to PST for scheduled robotic assisted lap b/l inguinal hernia repair on 3/21/24

## 2024-03-12 ENCOUNTER — APPOINTMENT (OUTPATIENT)
Dept: INTERNAL MEDICINE | Facility: CLINIC | Age: 69
End: 2024-03-12
Payer: MEDICARE

## 2024-03-12 VITALS
BODY MASS INDEX: 26.36 KG/M2 | OXYGEN SATURATION: 98 % | HEART RATE: 85 BPM | DIASTOLIC BLOOD PRESSURE: 88 MMHG | TEMPERATURE: 98.5 F | WEIGHT: 178 LBS | RESPIRATION RATE: 14 BRPM | HEIGHT: 69 IN | SYSTOLIC BLOOD PRESSURE: 150 MMHG

## 2024-03-12 VITALS — DIASTOLIC BLOOD PRESSURE: 80 MMHG | SYSTOLIC BLOOD PRESSURE: 150 MMHG

## 2024-03-12 DIAGNOSIS — E78.00 PURE HYPERCHOLESTEROLEMIA, UNSPECIFIED: ICD-10-CM

## 2024-03-12 DIAGNOSIS — Z01.818 ENCOUNTER FOR OTHER PREPROCEDURAL EXAMINATION: ICD-10-CM

## 2024-03-12 DIAGNOSIS — K40.20 BILATERAL INGUINAL HERNIA, W/OUT OBSTRUCTION OR GANGRENE, NOT SPECIFIED AS RECURRENT: ICD-10-CM

## 2024-03-12 DIAGNOSIS — I10 ESSENTIAL (PRIMARY) HYPERTENSION: ICD-10-CM

## 2024-03-12 PROCEDURE — G2211 COMPLEX E/M VISIT ADD ON: CPT

## 2024-03-12 PROCEDURE — 99214 OFFICE O/P EST MOD 30 MIN: CPT

## 2024-03-12 RX ORDER — VALSARTAN AND HYDROCHLOROTHIAZIDE 320; 25 MG/1; MG/1
320-25 TABLET, FILM COATED ORAL
Qty: 100 | Refills: 1 | Status: ACTIVE | COMMUNITY
Start: 2023-06-08 | End: 1900-01-01

## 2024-03-12 RX ORDER — ATORVASTATIN CALCIUM 10 MG/1
10 TABLET, FILM COATED ORAL
Qty: 100 | Refills: 1 | Status: ACTIVE | COMMUNITY
Start: 2023-06-08 | End: 1900-01-01

## 2024-03-12 NOTE — RESULTS/DATA
[] : results reviewed [ECG Reviewed] : reviewed [Normal] : The 12 - lead ECG is normal [NSR] : normal sinus rhythm [de-identified] : H&H:12.8,38.7 [de-identified] : Glucose:116

## 2024-03-12 NOTE — COUNSELING
[Weight management counseling provided] : Weight management [Healthy eating counseling provided] : healthy eating [Decrease Portions] : Decrease food portions [Good understanding] : Patient has a good understanding of disease, goals and obesity follow-up plan

## 2024-03-12 NOTE — ASSESSMENT
[Patient Optimized for Surgery] : Patient optimized for surgery [No Further Testing Recommended] : no further testing recommended [Modify medications prior to procedure] : Modify medications prior to procedure [FreeTextEntry4] : Cleared for surgery [FreeTextEntry7] : holf MVI for 1 week

## 2024-03-12 NOTE — PHYSICAL EXAM
[No Acute Distress] : no acute distress [Well Nourished] : well nourished [Well Developed] : well developed [Well-Appearing] : well-appearing [Normal Voice/Communication] : normal voice/communication [Normal Sclera/Conjunctiva] : normal sclera/conjunctiva [PERRL] : pupils equal round and reactive to light [EOMI] : extraocular movements intact [Normal Outer Ear/Nose] : the outer ears and nose were normal in appearance [Normal TMs] : both tympanic membranes were normal [Normal Oropharynx] : the oropharynx was normal [No JVD] : no jugular venous distention [No Lymphadenopathy] : no lymphadenopathy [Supple] : supple [Thyroid Normal, No Nodules] : the thyroid was normal and there were no nodules present [No Respiratory Distress] : no respiratory distress  [No Accessory Muscle Use] : no accessory muscle use [Clear to Auscultation] : lungs were clear to auscultation bilaterally [Normal Rate] : normal rate  [Normal S1, S2] : normal S1 and S2 [Regular Rhythm] : with a regular rhythm [No Murmur] : no murmur heard [No Carotid Bruits] : no carotid bruits [No Abdominal Bruit] : a ~M bruit was not heard ~T in the abdomen [No Varicosities] : no varicosities [Pedal Pulses Present] : the pedal pulses are present [No Edema] : there was no peripheral edema [No Palpable Aorta] : no palpable aorta [No Extremity Clubbing/Cyanosis] : no extremity clubbing/cyanosis [Soft] : abdomen soft [Non Tender] : non-tender [Non-distended] : non-distended [No Masses] : no abdominal mass palpated [No HSM] : no HSM [Normal Bowel Sounds] : normal bowel sounds [Normal Posterior Cervical Nodes] : no posterior cervical lymphadenopathy [Normal Supraclavicular Nodes] : no supraclavicular lymphadenopathy [Normal Anterior Cervical Nodes] : no anterior cervical lymphadenopathy [No CVA Tenderness] : no CVA  tenderness [No Joint Swelling] : no joint swelling [Grossly Normal Strength/Tone] : grossly normal strength/tone [No Spinal Tenderness] : no spinal tenderness [No Rash] : no rash [Coordination Grossly Intact] : coordination grossly intact [No Focal Deficits] : no focal deficits [Normal Gait] : normal gait [Deep Tendon Reflexes (DTR)] : deep tendon reflexes were 2+ and symmetric [Speech Grossly Normal] : speech grossly normal [Memory Grossly Normal] : memory grossly normal [Normal Affect] : the affect was normal [Alert and Oriented x3] : oriented to person, place, and time [Normal Mood] : the mood was normal [Normal Insight/Judgement] : insight and judgment were intact

## 2024-03-12 NOTE — HISTORY OF PRESENT ILLNESS
[(Patient denies any chest pain, claudication, dyspnea on exertion, orthopnea, palpitations or syncope)] : Patient denies any chest pain, claudication, dyspnea on exertion, orthopnea, palpitations or syncope [Atrial Fibrillation] : no atrial fibrillation [Aortic Stenosis] : no aortic stenosis [Coronary Artery Disease] : no coronary artery disease [Recent Myocardial Infarction] : no recent myocardial infarction [Implantable Device/Pacemaker] : no implantable device/pacemaker [Asthma] : no asthma [COPD] : no COPD [Sleep Apnea] : no sleep apnea [Smoker] : not a smoker [Family Member] : no family member with adverse anesthesia reaction/sudden death [Self] : no previous adverse anesthesia reaction [Chronic Anticoagulation] : no chronic anticoagulation [Chronic Kidney Disease] : no chronic kidney disease [Diabetes] : no diabetes [FreeTextEntry1] : Bilateral Inguinal Hernia Repair  [FreeTextEntry2] : 3/21/2024 [FreeTextEntry4] : JORDAN OLIVERA is a 69 year old M who presents today for medical clearance. Pt has a hx of HTN, HLD and BPH. . [FreeTextEntry3] : Dr. Garner Held

## 2024-03-12 NOTE — END OF VISIT
[FreeTextEntry3] : "I, Reagan Singleton, personally scribed the services dictated to me by Dr. Willi Marshall MD in this documentation on 03/12/2024"   "I Dr. Willi Marshall MD, personally performed the services described in this documentation on 03/12/2024 for the patient as scribed by Reagan Singleton in my presence. I have reviewed and verified that all the information is accurate and true."

## 2024-03-20 ENCOUNTER — TRANSCRIPTION ENCOUNTER (OUTPATIENT)
Age: 69
End: 2024-03-20

## 2024-03-21 ENCOUNTER — APPOINTMENT (OUTPATIENT)
Dept: SURGERY | Facility: HOSPITAL | Age: 69
End: 2024-03-21

## 2024-03-21 ENCOUNTER — OUTPATIENT (OUTPATIENT)
Dept: OUTPATIENT SERVICES | Facility: HOSPITAL | Age: 69
LOS: 1 days | Discharge: HOME | End: 2024-03-21
Payer: MEDICARE

## 2024-03-21 ENCOUNTER — TRANSCRIPTION ENCOUNTER (OUTPATIENT)
Age: 69
End: 2024-03-21

## 2024-03-21 VITALS
HEIGHT: 69 IN | HEART RATE: 64 BPM | RESPIRATION RATE: 16 BRPM | OXYGEN SATURATION: 100 % | DIASTOLIC BLOOD PRESSURE: 85 MMHG | WEIGHT: 177.91 LBS | SYSTOLIC BLOOD PRESSURE: 166 MMHG | TEMPERATURE: 98 F

## 2024-03-21 VITALS
DIASTOLIC BLOOD PRESSURE: 73 MMHG | HEART RATE: 60 BPM | SYSTOLIC BLOOD PRESSURE: 134 MMHG | OXYGEN SATURATION: 100 % | RESPIRATION RATE: 16 BRPM

## 2024-03-21 PROCEDURE — 49650 LAP ING HERNIA REPAIR INIT: CPT | Mod: AS,50

## 2024-03-21 PROCEDURE — S2900 ROBOTIC SURGICAL SYSTEM: CPT | Mod: NC

## 2024-03-21 PROCEDURE — 49650 LAP ING HERNIA REPAIR INIT: CPT | Mod: 50

## 2024-03-21 DEVICE — MESH HERNIA INGUINAL PROGRIP LAPAROSCOPIC RIGHT: Type: IMPLANTABLE DEVICE | Site: BILATERAL | Status: FUNCTIONAL

## 2024-03-21 DEVICE — MESH HERNIA INGUINAL PROGRIP LAPAROSCOPIC LEFT: Type: IMPLANTABLE DEVICE | Site: BILATERAL | Status: FUNCTIONAL

## 2024-03-21 RX ORDER — KETOROLAC TROMETHAMINE 30 MG/ML
15 SYRINGE (ML) INJECTION ONCE
Refills: 0 | Status: DISCONTINUED | OUTPATIENT
Start: 2024-03-21 | End: 2024-03-21

## 2024-03-21 RX ORDER — ONDANSETRON 8 MG/1
4 TABLET, FILM COATED ORAL ONCE
Refills: 0 | Status: DISCONTINUED | OUTPATIENT
Start: 2024-03-21 | End: 2024-03-22

## 2024-03-21 RX ORDER — ACETAMINOPHEN 500 MG
1000 TABLET ORAL ONCE
Refills: 0 | Status: COMPLETED | OUTPATIENT
Start: 2024-03-21 | End: 2024-03-21

## 2024-03-21 RX ORDER — CARVEDILOL PHOSPHATE 80 MG/1
0 CAPSULE, EXTENDED RELEASE ORAL
Qty: 0 | Refills: 0 | DISCHARGE

## 2024-03-21 RX ORDER — FENTANYL CITRATE 50 UG/ML
50 INJECTION INTRAVENOUS
Refills: 0 | Status: DISCONTINUED | OUTPATIENT
Start: 2024-03-21 | End: 2024-03-22

## 2024-03-21 RX ORDER — SODIUM CHLORIDE 9 MG/ML
1000 INJECTION, SOLUTION INTRAVENOUS
Refills: 0 | Status: DISCONTINUED | OUTPATIENT
Start: 2024-03-21 | End: 2024-03-22

## 2024-03-21 RX ORDER — FENTANYL CITRATE 50 UG/ML
25 INJECTION INTRAVENOUS
Refills: 0 | Status: DISCONTINUED | OUTPATIENT
Start: 2024-03-21 | End: 2024-03-22

## 2024-03-21 RX ADMIN — Medication 15 MILLIGRAM(S): at 09:55

## 2024-03-21 RX ADMIN — Medication 400 MILLIGRAM(S): at 09:45

## 2024-03-21 RX ADMIN — SODIUM CHLORIDE 75 MILLILITER(S): 9 INJECTION, SOLUTION INTRAVENOUS at 09:45

## 2024-03-21 NOTE — ASU DISCHARGE PLAN (ADULT/PEDIATRIC) - NS MD DC FALL RISK RISK
For information on Fall & Injury Prevention, visit: [email or web address] OR  [email or web address] OR  https://www.cdc.gov/steadi/patient.html

## 2024-03-21 NOTE — ASU PATIENT PROFILE, ADULT - FALL HARM RISK - UNIVERSAL INTERVENTIONS
Bed in lowest position, wheels locked, appropriate side rails in place/Call bell, personal items and telephone in reach/Instruct patient to call for assistance before getting out of bed or chair/Non-slip footwear when patient is out of bed/[place] to call system/Physically safe environment - no spills, clutter or unnecessary equipment/Purposeful Proactive Rounding/Room/bathroom lighting operational, light cord in reach

## 2024-03-22 ENCOUNTER — RX RENEWAL (OUTPATIENT)
Age: 69
End: 2024-03-22

## 2024-03-25 PROBLEM — E78.5 HYPERLIPIDEMIA, UNSPECIFIED: Chronic | Status: ACTIVE | Noted: 2024-03-07

## 2024-03-26 DIAGNOSIS — K40.20 BILATERAL INGUINAL HERNIA, WITHOUT OBSTRUCTION OR GANGRENE, NOT SPECIFIED AS RECURRENT: ICD-10-CM

## 2024-03-26 DIAGNOSIS — I10 ESSENTIAL (PRIMARY) HYPERTENSION: ICD-10-CM

## 2024-03-26 DIAGNOSIS — E78.00 PURE HYPERCHOLESTEROLEMIA, UNSPECIFIED: ICD-10-CM

## 2024-03-26 DIAGNOSIS — N40.0 BENIGN PROSTATIC HYPERPLASIA WITHOUT LOWER URINARY TRACT SYMPTOMS: ICD-10-CM

## 2024-03-27 ENCOUNTER — APPOINTMENT (OUTPATIENT)
Dept: SURGERY | Facility: CLINIC | Age: 69
End: 2024-03-27
Payer: MEDICARE

## 2024-03-27 VITALS
WEIGHT: 178 LBS | HEART RATE: 62 BPM | BODY MASS INDEX: 26.36 KG/M2 | DIASTOLIC BLOOD PRESSURE: 84 MMHG | HEIGHT: 69 IN | SYSTOLIC BLOOD PRESSURE: 149 MMHG | TEMPERATURE: 97.6 F | OXYGEN SATURATION: 99 %

## 2024-03-27 PROCEDURE — 99024 POSTOP FOLLOW-UP VISIT: CPT

## 2024-03-27 NOTE — ASSESSMENT
[FreeTextEntry1] : patient is doing well, states some early constipation which has resolved. Tolerating a diet. minimal discomfort.     incisions are c/d/i and healing well. + seroma in right groin.   f/u 3 weeks

## 2024-03-28 RX ORDER — TAMSULOSIN HYDROCHLORIDE 0.4 MG/1
0.4 CAPSULE ORAL
Qty: 90 | Refills: 3 | Status: ACTIVE | COMMUNITY
Start: 2022-08-17 | End: 1900-01-01

## 2024-04-04 LAB — PSA SERPL-MCNC: 2.8 NG/ML

## 2024-04-10 LAB
TESTOST FREE SERPL-MCNC: 7.1 PG/ML
TESTOST SERPL-MCNC: 427 NG/DL

## 2024-04-15 ENCOUNTER — APPOINTMENT (OUTPATIENT)
Dept: SURGERY | Facility: CLINIC | Age: 69
End: 2024-04-15
Payer: MEDICARE

## 2024-04-15 VITALS
BODY MASS INDEX: 26.07 KG/M2 | WEIGHT: 176 LBS | HEART RATE: 71 BPM | HEIGHT: 69 IN | TEMPERATURE: 97.9 F | DIASTOLIC BLOOD PRESSURE: 93 MMHG | SYSTOLIC BLOOD PRESSURE: 167 MMHG

## 2024-04-15 PROCEDURE — 99024 POSTOP FOLLOW-UP VISIT: CPT

## 2024-05-06 ENCOUNTER — APPOINTMENT (OUTPATIENT)
Dept: SURGERY | Facility: CLINIC | Age: 69
End: 2024-05-06
Payer: MEDICARE

## 2024-05-06 VITALS
HEIGHT: 69 IN | DIASTOLIC BLOOD PRESSURE: 88 MMHG | SYSTOLIC BLOOD PRESSURE: 167 MMHG | WEIGHT: 177 LBS | BODY MASS INDEX: 26.22 KG/M2 | HEART RATE: 59 BPM | TEMPERATURE: 98.1 F

## 2024-05-06 PROCEDURE — 99024 POSTOP FOLLOW-UP VISIT: CPT

## 2024-05-16 ENCOUNTER — RX RENEWAL (OUTPATIENT)
Age: 69
End: 2024-05-16

## 2024-05-16 RX ORDER — AMLODIPINE BESYLATE 2.5 MG/1
2.5 TABLET ORAL
Qty: 30 | Refills: 0 | Status: ACTIVE | COMMUNITY
Start: 2024-03-12 | End: 1900-01-01

## 2024-05-23 RX ORDER — TADALAFIL 5 MG/1
5 TABLET ORAL
Qty: 90 | Refills: 1 | Status: ACTIVE | COMMUNITY
Start: 2023-12-13 | End: 1900-01-01

## 2024-06-04 ENCOUNTER — APPOINTMENT (OUTPATIENT)
Dept: OTOLARYNGOLOGY | Facility: CLINIC | Age: 69
End: 2024-06-04

## 2024-07-08 ENCOUNTER — NON-APPOINTMENT (OUTPATIENT)
Age: 69
End: 2024-07-08

## 2024-07-09 ENCOUNTER — NON-APPOINTMENT (OUTPATIENT)
Age: 69
End: 2024-07-09

## 2024-07-09 ENCOUNTER — APPOINTMENT (OUTPATIENT)
Dept: ORTHOPEDIC SURGERY | Facility: CLINIC | Age: 69
End: 2024-07-09
Payer: MEDICARE

## 2024-07-09 VITALS — WEIGHT: 177 LBS | BODY MASS INDEX: 26.22 KG/M2 | HEIGHT: 69 IN

## 2024-07-09 DIAGNOSIS — M51.36 OTHER INTERVERTEBRAL DISC DEGENERATION, LUMBAR REGION: ICD-10-CM

## 2024-07-09 PROCEDURE — 99214 OFFICE O/P EST MOD 30 MIN: CPT

## 2024-07-09 PROCEDURE — 72100 X-RAY EXAM L-S SPINE 2/3 VWS: CPT

## 2024-07-09 PROCEDURE — 99204 OFFICE O/P NEW MOD 45 MIN: CPT

## 2024-08-14 ENCOUNTER — RX RENEWAL (OUTPATIENT)
Age: 69
End: 2024-08-14

## 2024-08-14 ENCOUNTER — TRANSCRIPTION ENCOUNTER (OUTPATIENT)
Age: 69
End: 2024-08-14

## 2024-08-26 ENCOUNTER — APPOINTMENT (OUTPATIENT)
Dept: ORTHOPEDIC SURGERY | Facility: CLINIC | Age: 69
End: 2024-08-26
Payer: MEDICARE

## 2024-08-26 DIAGNOSIS — M17.12 UNILATERAL PRIMARY OSTEOARTHRITIS, LEFT KNEE: ICD-10-CM

## 2024-08-26 DIAGNOSIS — M25.562 PAIN IN LEFT KNEE: ICD-10-CM

## 2024-08-26 DIAGNOSIS — M25.462 EFFUSION, LEFT KNEE: ICD-10-CM

## 2024-08-26 PROCEDURE — 99214 OFFICE O/P EST MOD 30 MIN: CPT

## 2024-08-26 RX ORDER — MELOXICAM 15 MG/1
15 TABLET ORAL
Qty: 21 | Refills: 2 | Status: ACTIVE | COMMUNITY
Start: 2024-08-26 | End: 1900-01-01

## 2024-08-26 NOTE — PHYSICAL EXAM
[de-identified] : Left knee Physical Examination:   General: Alert and oriented x3.  In no acute distress.  Pleasant in nature with a normal affect.  No apparent respiratory distress. +no groin pain   Erythema, Warmth, Rubor: Negative Swelling/Edema: Negative ROM: limited motion Katty's Test: Negative Medial Joint Line TTP: Negative Lateral Joint Line TTP: Negative Lachman Exam/Anterior Drawer/Pivot Shift Test: Negative MCL Pain: Negative LCL Pain: Negative Iliotibial Band Pain: Negative Patellofemoral Joint Pain: Negative Patellar Tendon TTP: Negative Pes Anserinus TTP: Negative Homans Sign: Negative Posterior Knee Pain: Negative Neuro: Intact with no sensory or motor deficits [de-identified] : X-ray of the left knee obtained from [facility] and reviewed in the office today, 08/26/2024, revealed: no evidence of fracture or dislocation. Minimal changes since last visit. Arthritis present.

## 2024-08-26 NOTE — DISCUSSION/SUMMARY
[de-identified] : Today I had a lengthy discussion with the patient regarding their left knee pain. I have addressed all the patient's concerns surrounding the pathology of their condition. I have reviewed the patient's XR imaging with them in great detail. No evidence of fracture or dislocation. Minimal changes since last visit. Arthritis present. At this time, I would like to obtain advanced imaging before we consider a cortisone injection, as his symptoms are disproportionate to the x-ray imaging. For now, the patient can start conservative treatments.    Plan:  1. At this time, I would like to obtain advanced imaging of the patient's left knee without contrast to evaluate for ligament tear, cartilage injury, tendon tear, occult injury. The patient should follow up with the office after obtaining the MRI. 2. I recommend that the patient utilize meloxicam with food once per day as instructed. A prescription for the meloxicam was ordered for the patient in the office today. 3. I recommend that the patient utilize ice, NSAIDS/Tylenol PRN, and heat.   f/u after MRI   The patient understood and verbally agreed to the treatment plan. All of their questions were answered, and they were satisfied with the visit. The patient should call the office if they have any questions or experience worsening symptoms. No

## 2024-08-26 NOTE — HISTORY OF PRESENT ILLNESS
[FreeTextEntry1] : 08/26/2024: JORDAN OLIVERA is a 69 year old male presenting to the office for an initial evaluation of left knee pain. He was last seen here in 2020 for the same complaint. He reports that the pain began last Tuesday. The patient cannot attribute their pain to any injury, fall, or trauma. He went to [facility]. The patient endorses pain in the anterior aspect of his knee. He uses Tylenol, which provides minimal relief. The patient presents to the office in sneakers and ambulating without assistance.

## 2024-08-26 NOTE — ADDENDUM
[FreeTextEntry1] : I, Dani Roscoe, acted solely as a scribe for Dr. Cornelius Babb on this date 08/26/2024.   All medical record entries made by the Scribe were at my, Dr. Cornelius Babb, direction and personally dictated by me on 08/26/2024. I have reviewed the chart and agree that the record accurately reflects my personal performance of the history, physical exam, assessment and plan. I have also personally directed, reviewed, and agreed with the chart.

## 2024-08-28 ENCOUNTER — APPOINTMENT (OUTPATIENT)
Dept: ORTHOPEDIC SURGERY | Facility: CLINIC | Age: 69
End: 2024-08-28
Payer: MEDICARE

## 2024-08-28 DIAGNOSIS — S76.112A STRAIN OF LEFT QUADRICEPS MUSCLE, FASCIA AND TENDON, INITIAL ENCOUNTER: ICD-10-CM

## 2024-08-28 DIAGNOSIS — M76.899 OTHER SPECIFIED ENTHESOPATHIES OF UNSPECIFIED LOWER LIMB, EXCLUDING FOOT: ICD-10-CM

## 2024-08-28 PROCEDURE — 99214 OFFICE O/P EST MOD 30 MIN: CPT

## 2024-08-28 NOTE — DISCUSSION/SUMMARY
[de-identified] : Today I had a lengthy discussion with the patient regarding their left knee pain. I have addressed all the patient's concerns surrounding the pathology of their condition.    Plan:  1.  I have reviewed the patient's MRI results with them in great detail. 2. I recommend that the patient utilize ice, NSAIDS PRN, and heat. They can also elevate their LLE above the level of the heart. 3.  I recommend the patient undergo a course of physical therapy for the left knee 2-3 times a week for a total of 8-12 weeks. A prescription was given for the physical therapy today. 4. I recommended that the patient utilize an knee brace. The patient was fitted for the ASO brace in the office today.  f/u in in 6-8 weeks   The patient understood and verbally agreed to the treatment plan. All of their questions were answered, and they were satisfied with the visit. The patient should call the office if they have any questions or experience worsening symptoms.

## 2024-08-28 NOTE — ADDENDUM
[FreeTextEntry1] : I, Cornelius Mcdaniel, acted solely as a scribe for Dr. Cornelius Babb on this date 08/28/2024  .   All medical record entries made by the Scribe were at my, Dr. Cornelius Babb, direction and personally dictated by me on 08/28/2024 . I have reviewed the chart and agree that the record accurately reflects my personal performance of the history, physical exam, assessment and plan. I have also personally directed, reviewed, and agreed with the chart.

## 2024-08-28 NOTE — PHYSICAL EXAM
[de-identified] : Left knee Physical Examination:   General: Alert and oriented x3.  In no acute distress.  Pleasant in nature with a normal affect.  No apparent respiratory distress. +no groin pain  able to extend but weak with straight leg knee raise   Erythema, Warmth, Rubor: Negative Swelling/Edema: Negative ROM: limited motion Katty's Test: Negative Medial Joint Line TTP: Negative Lateral Joint Line TTP: Negative Lachman Exam/Anterior Drawer/Pivot Shift Test: Negative MCL Pain: Negative LCL Pain: Negative Iliotibial Band Pain: Negative Patellofemoral Joint Pain: Negative Patellar Tendon TTP: Negative Pes Anserinus TTP: Negative Homans Sign: Negative Posterior Knee Pain: Negative Neuro: Intact with no sensory or motor deficits [de-identified] : Patient Name: Raffi June Patient Phone Number: [phone] Patient ID: [id number] : 1955 Date of Exam: 2024 R. Phys. Name: Cornelius Babb R. Phys. Address: [address] [place] [address] R Phys. Phone: [phone] MRI-LEFT KNEE NON CONTRAST  HISTORY: Left knee pain  TECHNIQUE: MR imaging of the left knee was performed without IV contrast on a 1.5 Haritha MRI utilizing the following pulse sequences: Sagittal proton density with and without fat suppression, axial proton density fat-suppressed, and coronal proton density fat-suppressed.  COMPARISON: No prior studies are available for comparison.  FINDINGS:  COLLATERAL LIGAMENTS: The medial collateral ligament is intact. The lateral collateral ligament, biceps femoris tendon, iliotibial band, and popliteus tendon are intact.  CRUCIATE LIGAMENTS: The anterior and posterior cruciate ligaments are intact.  MENISCI: The medial meniscus is intact. The lateral meniscus are intact.  CARTILAGE: The chondral surfaces in the medial compartment are intact The chondral surfaces in the lateral compartment are intact The chondral surfaces in the anterior compartment are intact.  BONES: The visualized osseous structures demonstrate normal bone marrow and cortical signal intensity without evidence of fracture, trabecular bone injury or dislocation. No osseous lesions are identified.  EXTENSOR MECHANISM: There is partial tearing of the vastus medialis muscle distally and extending to the vastus medialis oblique views. There is partial tearing of the quadriceps tendon involving the lateral fibers from the vastus lateralis muscle. There is partial tearing of the quadriceps and continuation over the patella with fluid separation between the patella.  JOINT: Large knee joint effusion. There is a ganglion cyst posterior to the lateral plateau measuring 1. 1.5 cm. There is low-grade partial tearing of the semimembranosus tendon insertion. There is prepatellar subcutaneous edema  The neurovascular structures demonstrate normal course.  IMPRESSION:  1. Partial tear of the vastus medialis muscle distally extending to the vastus medialis oblique muscle. Partial tear of the quadriceps tendon involving the lateral fibers from the vastus lateralis muscle. 2. Partial tear of the quadriceps and attenuation over the patella with fluid separation. 3. Large knee joint effusion. 4. Intact medial and lateral meniscus. 5. No acute osseous injury. 6. Low-grade partial tearing the semimembranosus tendon insertion.  Signed by: Jacob Crocker MD Signed Date: 2024 9:07 AM EDT    SIGNED BY: Jacob Crocker M.D., Ext. [phone] 09:07 AM

## 2024-09-06 DIAGNOSIS — R97.20 ELEVATED PROSTATE, SPECIFIC ANTIGEN [PSA]: ICD-10-CM

## 2024-09-30 ENCOUNTER — APPOINTMENT (OUTPATIENT)
Dept: ORTHOPEDIC SURGERY | Facility: CLINIC | Age: 69
End: 2024-09-30
Payer: MEDICARE

## 2024-09-30 DIAGNOSIS — M25.562 PAIN IN LEFT KNEE: ICD-10-CM

## 2024-09-30 DIAGNOSIS — M17.12 UNILATERAL PRIMARY OSTEOARTHRITIS, LEFT KNEE: ICD-10-CM

## 2024-09-30 DIAGNOSIS — S76.112A STRAIN OF LEFT QUADRICEPS MUSCLE, FASCIA AND TENDON, INITIAL ENCOUNTER: ICD-10-CM

## 2024-09-30 PROCEDURE — 99213 OFFICE O/P EST LOW 20 MIN: CPT

## 2024-09-30 NOTE — PHYSICAL EXAM
[de-identified] : Left knee Physical Examination:  General: Alert and oriented x3.  In no acute distress.  Pleasant in nature with a normal affect.  No apparent respiratory distress.   Erythema, Warmth, Rubor: Negative Swelling/Edema: Negative ROM: 0-130 degrees Katty's Test: Negative  Medial Joint Line TTP: Negative Lateral Joint Line TTP: Negative  Lachman Exam/Anterior Drawer/Pivot Shift Test: Negative  MCL Pain: Negative LCL Pain: Negative Iliotibial Band Pain: Negative Patellofemoral Joint Pain: Negative Patellar Tendon TTP: Negative Pes Anserinus TTP: Negative Homans Sign: Negative Posterior Knee Pain: Negative Neuro: Intact with no sensory or motor deficits [de-identified] : Previous MRI findings of his left knee below:  Patient Name: Raffi June Patient Phone Number: [phone] Patient ID: [id number] : 1955 Date of Exam: 2024 R. Phys. Name: Cornelius Babb R. Phys. Address: [address] [place] [address] R Phys. Phone: [phone] MRI-LEFT KNEE NON CONTRAST  HISTORY: Left knee pain  TECHNIQUE: MR imaging of the left knee was performed without IV contrast on a 1.5 Haritha MRI utilizing the following pulse sequences: Sagittal proton density with and without fat suppression, axial proton density fat-suppressed, and coronal proton density fat-suppressed.  COMPARISON: No prior studies are available for comparison.  FINDINGS:  COLLATERAL LIGAMENTS: The medial collateral ligament is intact. The lateral collateral ligament, biceps femoris tendon, iliotibial band, and popliteus tendon are intact.  CRUCIATE LIGAMENTS: The anterior and posterior cruciate ligaments are intact.  MENISCI: The medial meniscus is intact. The lateral meniscus are intact.  CARTILAGE: The chondral surfaces in the medial compartment are intact The chondral surfaces in the lateral compartment are intact The chondral surfaces in the anterior compartment are intact.  BONES: The visualized osseous structures demonstrate normal bone marrow and cortical signal intensity without evidence of fracture, trabecular bone injury or dislocation. No osseous lesions are identified.  EXTENSOR MECHANISM: There is partial tearing of the vastus medialis muscle distally and extending to the vastus medialis oblique views. There is partial tearing of the quadriceps tendon involving the lateral fibers from the vastus lateralis muscle. There is partial tearing of the quadriceps and continuation over the patella with fluid separation between the patella.  JOINT: Large knee joint effusion. There is a ganglion cyst posterior to the lateral plateau measuring 1. 1.5 cm. There is low-grade partial tearing of the semimembranosus tendon insertion. There is prepatellar subcutaneous edema  The neurovascular structures demonstrate normal course.  IMPRESSION:  1. Partial tear of the vastus medialis muscle distally extending to the vastus medialis oblique muscle. Partial tear of the quadriceps tendon involving the lateral fibers from the vastus lateralis muscle. 2. Partial tear of the quadriceps and attenuation over the patella with fluid separation. 3. Large knee joint effusion. 4. Intact medial and lateral meniscus. 5. No acute osseous injury. 6. Low-grade partial tearing the semimembranosus tendon insertion.  Signed by: Jacob Crocker MD Signed Date: 2024 9:07 AM EDT    SIGNED BY: Jacob Crocker M.D., Ext. [phone] 09:07 AM

## 2024-09-30 NOTE — HISTORY OF PRESENT ILLNESS
[FreeTextEntry1] : 9/30/2024: The patient is a 69-year-old male who presents to the office for follow-up evaluation of his left knee pain, quadriceps partial tear.  He is currently enrolled in physical therapy stars in [facility] and states that his knee is feeling much better.  His strength is increasing and he has no pain today in the office.  He states that he continues to wear his brace intermittently which helps him.  He is not wearing a brace today and is walking without a limp in regular sneakers.  08/28/2024: JORDAN OLIVERA is a 69 year old male presenting to the office for a follow up evaluation of his left knee pain and MRI review. He reports that his symptoms have remained the same since his last visit to the office. The patient presents to the office in sneakers and ambulating without assistance.  08/26/2024: JORDAN OLIVERA is a 69 year old male presenting to the office for an initial evaluation of left knee pain. He was last seen here in 2020 for the same complaint. He reports that the pain began last Tuesday. The patient cannot attribute their pain to any injury, fall, or trauma. He went to [facility]. The patient endorses pain in the anterior aspect of his knee. He uses Tylenol, which provides minimal relief. The patient presents to the office in sneakers and ambulating without assistance.
none

## 2024-09-30 NOTE — DISCUSSION/SUMMARY
[de-identified] : Patient's knee is overall improved.  He has no pain today.  He has good strength on physical examination of his quadricep muscles and he has no pain or defect in the distal quadriceps area.  An updated physical therapy scription was given to continue with a strengthening program as tolerated.  He can continue to wear an over-the-counter knee brace for sleeve for support as needed.  All of his questions were answered.  He can follow-up here on an as-needed basis.  He can slowly increase activities with the help from his physical therapist as tolerated.   The patient understood and verbally agreed to the treatment plan. All of their questions were answered, and they were satisfied with the visit. The patient should call the office if they have any questions or experience worsening symptoms.

## 2024-10-22 ENCOUNTER — RX RENEWAL (OUTPATIENT)
Age: 69
End: 2024-10-22

## 2024-10-23 ENCOUNTER — APPOINTMENT (OUTPATIENT)
Dept: ORTHOPEDIC SURGERY | Facility: CLINIC | Age: 69
End: 2024-10-23
Payer: MEDICARE

## 2024-10-23 ENCOUNTER — RX RENEWAL (OUTPATIENT)
Age: 69
End: 2024-10-23

## 2024-10-23 DIAGNOSIS — Z87.828 PERSONAL HISTORY OF OTHER (HEALED) PHYSICAL INJURY AND TRAUMA: ICD-10-CM

## 2024-10-23 DIAGNOSIS — S76.112A STRAIN OF LEFT QUADRICEPS MUSCLE, FASCIA AND TENDON, INITIAL ENCOUNTER: ICD-10-CM

## 2024-10-23 DIAGNOSIS — M25.572 PAIN IN LEFT ANKLE AND JOINTS OF LEFT FOOT: ICD-10-CM

## 2024-10-23 PROCEDURE — 99214 OFFICE O/P EST MOD 30 MIN: CPT

## 2024-10-23 PROCEDURE — 73610 X-RAY EXAM OF ANKLE: CPT | Mod: LT

## 2024-11-22 ENCOUNTER — APPOINTMENT (OUTPATIENT)
Dept: INTERNAL MEDICINE | Facility: CLINIC | Age: 69
End: 2024-11-22
Payer: MEDICARE

## 2024-11-22 ENCOUNTER — NON-APPOINTMENT (OUTPATIENT)
Age: 69
End: 2024-11-22

## 2024-11-22 VITALS
TEMPERATURE: 98.2 F | OXYGEN SATURATION: 99 % | WEIGHT: 176 LBS | BODY MASS INDEX: 26.07 KG/M2 | DIASTOLIC BLOOD PRESSURE: 82 MMHG | SYSTOLIC BLOOD PRESSURE: 138 MMHG | RESPIRATION RATE: 14 BRPM | HEART RATE: 64 BPM | HEIGHT: 69 IN

## 2024-11-22 DIAGNOSIS — I10 ESSENTIAL (PRIMARY) HYPERTENSION: ICD-10-CM

## 2024-11-22 DIAGNOSIS — Z00.00 ENCOUNTER FOR GENERAL ADULT MEDICAL EXAMINATION W/OUT ABNORMAL FINDINGS: ICD-10-CM

## 2024-11-22 DIAGNOSIS — Z23 ENCOUNTER FOR IMMUNIZATION: ICD-10-CM

## 2024-11-22 DIAGNOSIS — E78.00 PURE HYPERCHOLESTEROLEMIA, UNSPECIFIED: ICD-10-CM

## 2024-11-22 PROCEDURE — 93000 ELECTROCARDIOGRAM COMPLETE: CPT

## 2024-11-22 PROCEDURE — G0008: CPT

## 2024-11-22 PROCEDURE — G0439: CPT

## 2024-11-22 PROCEDURE — 90662 IIV NO PRSV INCREASED AG IM: CPT

## 2024-11-24 LAB
25(OH)D3 SERPL-MCNC: 20.8 NG/ML
ALBUMIN SERPL ELPH-MCNC: 4.5 G/DL
ALP BLD-CCNC: 92 U/L
ALT SERPL-CCNC: 19 U/L
ANION GAP SERPL CALC-SCNC: 16 MMOL/L
APPEARANCE: CLEAR
AST SERPL-CCNC: 20 U/L
BACTERIA: NEGATIVE /HPF
BASOPHILS # BLD AUTO: 0.04 K/UL
BASOPHILS NFR BLD AUTO: 1 %
BILIRUB SERPL-MCNC: 0.5 MG/DL
BILIRUBIN URINE: NEGATIVE
BLOOD URINE: NEGATIVE
BUN SERPL-MCNC: 20 MG/DL
CALCIUM SERPL-MCNC: 9.6 MG/DL
CAST: 0 /LPF
CHLORIDE SERPL-SCNC: 100 MMOL/L
CHOLEST SERPL-MCNC: 176 MG/DL
CK SERPL-CCNC: 120 U/L
CO2 SERPL-SCNC: 24 MMOL/L
COLOR: YELLOW
CREAT SERPL-MCNC: 1.05 MG/DL
EGFR: 78 ML/MIN/1.73M2
EOSINOPHIL # BLD AUTO: 0.11 K/UL
EOSINOPHIL NFR BLD AUTO: 2.8 %
EPITHELIAL CELLS: 0 /HPF
ESTIMATED AVERAGE GLUCOSE: 114 MG/DL
GLUCOSE QUALITATIVE U: NEGATIVE MG/DL
GLUCOSE SERPL-MCNC: 108 MG/DL
HBA1C MFR BLD HPLC: 5.6 %
HCT VFR BLD CALC: 41.1 %
HDLC SERPL-MCNC: 48 MG/DL
HGB BLD-MCNC: 13.8 G/DL
IMM GRANULOCYTES NFR BLD AUTO: 0.3 %
KETONES URINE: NEGATIVE MG/DL
LDLC SERPL CALC-MCNC: 110 MG/DL
LEUKOCYTE ESTERASE URINE: NEGATIVE
LYMPHOCYTES # BLD AUTO: 1.42 K/UL
LYMPHOCYTES NFR BLD AUTO: 35.7 %
MAN DIFF?: NORMAL
MCHC RBC-ENTMCNC: 29.4 PG
MCHC RBC-ENTMCNC: 33.6 G/DL
MCV RBC AUTO: 87.6 FL
MICROSCOPIC-UA: NORMAL
MONOCYTES # BLD AUTO: 0.41 K/UL
MONOCYTES NFR BLD AUTO: 10.3 %
NEUTROPHILS # BLD AUTO: 1.99 K/UL
NEUTROPHILS NFR BLD AUTO: 49.9 %
NITRITE URINE: NEGATIVE
NONHDLC SERPL-MCNC: 129 MG/DL
PH URINE: 7
PLATELET # BLD AUTO: 225 K/UL
POTASSIUM SERPL-SCNC: 4 MMOL/L
PROT SERPL-MCNC: 7.3 G/DL
PROTEIN URINE: NEGATIVE MG/DL
PSA SERPL-MCNC: 2.25 NG/ML
RBC # BLD: 4.69 M/UL
RBC # FLD: 14.3 %
RED BLOOD CELLS URINE: 0 /HPF
SODIUM SERPL-SCNC: 140 MMOL/L
SPECIFIC GRAVITY URINE: 1.02
TRIGL SERPL-MCNC: 105 MG/DL
TSH SERPL-ACNC: 3.35 UIU/ML
UROBILINOGEN URINE: 0.2 MG/DL
WBC # FLD AUTO: 3.98 K/UL
WHITE BLOOD CELLS URINE: 0 /HPF

## 2024-12-25 PROBLEM — F10.90 ALCOHOL USE: Status: ACTIVE | Noted: 2020-06-09

## 2025-01-14 NOTE — ED ADULT NURSE NOTE - NS ED PATIENT SAFETY CONCERN
----- Message from Melania Lopez PA-C sent at 1/14/2025  4:35 PM CST -----  Please request CXR images on CD from [facility] ER visit 1/8/25. Report was abnormal. Thanks!   No

## 2025-01-27 ENCOUNTER — RX RENEWAL (OUTPATIENT)
Age: 70
End: 2025-01-27

## 2025-01-28 ENCOUNTER — NON-APPOINTMENT (OUTPATIENT)
Age: 70
End: 2025-01-28

## 2025-02-06 ENCOUNTER — APPOINTMENT (OUTPATIENT)
Dept: UROLOGY | Facility: CLINIC | Age: 70
End: 2025-02-06

## 2025-02-06 VITALS
TEMPERATURE: 98.7 F | WEIGHT: 176.44 LBS | DIASTOLIC BLOOD PRESSURE: 70 MMHG | RESPIRATION RATE: 16 BRPM | HEART RATE: 69 BPM | BODY MASS INDEX: 26.13 KG/M2 | HEIGHT: 69 IN | SYSTOLIC BLOOD PRESSURE: 169 MMHG | OXYGEN SATURATION: 99 %

## 2025-02-06 DIAGNOSIS — N52.9 MALE ERECTILE DYSFUNCTION, UNSPECIFIED: ICD-10-CM

## 2025-02-06 DIAGNOSIS — N13.8 BENIGN PROSTATIC HYPERPLASIA WITH LOWER URINARY TRACT SYMPMS: ICD-10-CM

## 2025-02-06 DIAGNOSIS — R97.20 ELEVATED PROSTATE, SPECIFIC ANTIGEN [PSA]: ICD-10-CM

## 2025-02-06 DIAGNOSIS — N40.1 BENIGN PROSTATIC HYPERPLASIA WITH LOWER URINARY TRACT SYMPMS: ICD-10-CM

## 2025-02-06 PROCEDURE — 99214 OFFICE O/P EST MOD 30 MIN: CPT | Mod: 25

## 2025-02-06 PROCEDURE — 51741 ELECTRO-UROFLOWMETRY FIRST: CPT

## 2025-02-21 PROBLEM — Z87.898 HISTORY OF ELEVATED PROSTATE SPECIFIC ANTIGEN (PSA): Status: RESOLVED | Noted: 2022-08-03 | Resolved: 2025-02-21

## 2025-03-12 NOTE — HISTORY OF PRESENT ILLNESS
[FreeTextEntry1] :  08/28/2024: JORDAN OLIVERA is a 69 year old male presenting to the office for a follow up evaluation of his left knee pain and MRI review. He reports that his symptoms have remained the same since his last visit to the office. The patient presents to the office in sneakers and ambulating without assistance.  08/26/2024: JORDAN OLIVERA is a 69 year old male presenting to the office for an initial evaluation of left knee pain. He was last seen here in 2020 for the same complaint. He reports that the pain began last Tuesday. The patient cannot attribute their pain to any injury, fall, or trauma. He went to [facility]. The patient endorses pain in the anterior aspect of his knee. He uses Tylenol, which provides minimal relief. The patient presents to the office in sneakers and ambulating without assistance.
IMPROVE-DD Application Not Available

## 2025-04-10 NOTE — ED ADULT NURSE REASSESSMENT NOTE - NEURO MENTATION
normal
Physical Exam    Vital Signs: I have reviewed the initial vital signs.  Constitutional: well-nourished, appears stated age, no acute distress  Eyes: Conjunctiva pink, Sclera clear  Cardiovascular: regular rate, regular rhythm, well-perfused extremities, radial pulses equal and 2+ b/l.   Respiratory: unlabored respiratory effort, clear to auscultation bilaterally no wheezing, rales and rhonchi. pt is speaking full sentences. no accessory muscle use.   Gastrointestinal: soft, non-tender, nondistended abdomen, no pulsatile mass, normal bowl sounds, no rebound, no guarding, no cva tenderness  Musculoskeletal: supple neck, FROM of b/l upper and lower extremities, no midline tenderness, no palpable spinal step offs  Integumentary: warm, dry, no rash  Neurologic: awake, alert, extremities’ motor and sensory functions grossly intact. steady gait.   Psychiatric: appropriate mood, appropriate affect

## 2025-05-05 ENCOUNTER — NON-APPOINTMENT (OUTPATIENT)
Age: 70
End: 2025-05-05

## 2025-06-02 ENCOUNTER — NON-APPOINTMENT (OUTPATIENT)
Age: 70
End: 2025-06-02

## 2025-06-03 ENCOUNTER — NON-APPOINTMENT (OUTPATIENT)
Age: 70
End: 2025-06-03

## 2025-06-04 ENCOUNTER — APPOINTMENT (OUTPATIENT)
Dept: NEUROSURGERY | Facility: CLINIC | Age: 70
End: 2025-06-04
Payer: MEDICARE

## 2025-06-04 VITALS
HEIGHT: 69 IN | SYSTOLIC BLOOD PRESSURE: 174 MMHG | HEART RATE: 68 BPM | OXYGEN SATURATION: 99 % | WEIGHT: 179 LBS | DIASTOLIC BLOOD PRESSURE: 100 MMHG | BODY MASS INDEX: 26.51 KG/M2

## 2025-06-04 DIAGNOSIS — R25.2 CRAMP AND SPASM: ICD-10-CM

## 2025-06-04 PROCEDURE — 99214 OFFICE O/P EST MOD 30 MIN: CPT

## 2025-06-16 ENCOUNTER — APPOINTMENT (OUTPATIENT)
Dept: NEUROSURGERY | Facility: CLINIC | Age: 70
End: 2025-06-16

## 2025-06-17 ENCOUNTER — APPOINTMENT (OUTPATIENT)
Dept: NEUROSURGERY | Facility: CLINIC | Age: 70
End: 2025-06-17
Payer: MEDICARE

## 2025-06-17 ENCOUNTER — APPOINTMENT (OUTPATIENT)
Dept: ULTRASOUND IMAGING | Facility: CLINIC | Age: 70
End: 2025-06-17

## 2025-06-17 ENCOUNTER — APPOINTMENT (OUTPATIENT)
Dept: MRI IMAGING | Facility: CLINIC | Age: 70
End: 2025-06-17

## 2025-06-17 VITALS
OXYGEN SATURATION: 99 % | BODY MASS INDEX: 26.36 KG/M2 | HEIGHT: 69 IN | DIASTOLIC BLOOD PRESSURE: 82 MMHG | HEART RATE: 66 BPM | WEIGHT: 178 LBS | SYSTOLIC BLOOD PRESSURE: 159 MMHG

## 2025-06-17 PROCEDURE — 99214 OFFICE O/P EST MOD 30 MIN: CPT

## 2025-06-28 ENCOUNTER — LABORATORY RESULT (OUTPATIENT)
Age: 70
End: 2025-06-28

## 2025-06-28 LAB — PSA SERPL-MCNC: 2.23 NG/ML

## 2025-06-30 ENCOUNTER — APPOINTMENT (OUTPATIENT)
Dept: SURGERY | Facility: CLINIC | Age: 70
End: 2025-06-30
Payer: MEDICARE

## 2025-06-30 VITALS
BODY MASS INDEX: 26.36 KG/M2 | HEIGHT: 69 IN | TEMPERATURE: 97.2 F | HEART RATE: 80 BPM | WEIGHT: 178 LBS | SYSTOLIC BLOOD PRESSURE: 130 MMHG | DIASTOLIC BLOOD PRESSURE: 76 MMHG

## 2025-06-30 PROCEDURE — 99213 OFFICE O/P EST LOW 20 MIN: CPT

## 2025-07-01 ENCOUNTER — NON-APPOINTMENT (OUTPATIENT)
Age: 70
End: 2025-07-01

## 2025-07-01 ENCOUNTER — APPOINTMENT (OUTPATIENT)
Dept: VASCULAR SURGERY | Facility: CLINIC | Age: 70
End: 2025-07-01
Payer: MEDICARE

## 2025-07-01 PROBLEM — G62.9 NEUROPATHY: Status: ACTIVE | Noted: 2025-07-01

## 2025-07-01 PROCEDURE — 99204 OFFICE O/P NEW MOD 45 MIN: CPT

## 2025-08-06 ENCOUNTER — RX RENEWAL (OUTPATIENT)
Age: 70
End: 2025-08-06

## 2025-09-13 ENCOUNTER — EMERGENCY (EMERGENCY)
Facility: HOSPITAL | Age: 70
LOS: 1 days | End: 2025-09-13
Attending: STUDENT IN AN ORGANIZED HEALTH CARE EDUCATION/TRAINING PROGRAM | Admitting: STUDENT IN AN ORGANIZED HEALTH CARE EDUCATION/TRAINING PROGRAM
Payer: MEDICARE

## 2025-09-13 VITALS
TEMPERATURE: 98 F | DIASTOLIC BLOOD PRESSURE: 79 MMHG | RESPIRATION RATE: 16 BRPM | HEART RATE: 65 BPM | SYSTOLIC BLOOD PRESSURE: 154 MMHG | OXYGEN SATURATION: 98 %

## 2025-09-13 VITALS
HEART RATE: 61 BPM | OXYGEN SATURATION: 100 % | SYSTOLIC BLOOD PRESSURE: 174 MMHG | DIASTOLIC BLOOD PRESSURE: 85 MMHG | TEMPERATURE: 98 F | RESPIRATION RATE: 15 BRPM | WEIGHT: 177.91 LBS | HEIGHT: 69 IN

## 2025-09-13 LAB
ALBUMIN SERPL ELPH-MCNC: 4 G/DL — SIGNIFICANT CHANGE UP (ref 3.3–5)
ALP SERPL-CCNC: 112 U/L — SIGNIFICANT CHANGE UP (ref 30–120)
ALT FLD-CCNC: 27 U/L — SIGNIFICANT CHANGE UP (ref 10–60)
ANION GAP SERPL CALC-SCNC: 7 MMOL/L — SIGNIFICANT CHANGE UP (ref 5–17)
APTT BLD: 29.3 SEC — SIGNIFICANT CHANGE UP (ref 26.1–36.8)
AST SERPL-CCNC: 25 U/L — SIGNIFICANT CHANGE UP (ref 10–40)
BASOPHILS # BLD AUTO: 0.06 K/UL — SIGNIFICANT CHANGE UP (ref 0–0.2)
BASOPHILS # BLD MANUAL: 0.07 K/UL — SIGNIFICANT CHANGE UP (ref 0–0.2)
BASOPHILS NFR BLD AUTO: 0.8 % — SIGNIFICANT CHANGE UP (ref 0–2)
BASOPHILS NFR BLD MANUAL: 0.9 % — SIGNIFICANT CHANGE UP (ref 0–2)
BILIRUB SERPL-MCNC: 0.6 MG/DL — SIGNIFICANT CHANGE UP (ref 0.2–1.2)
BUN SERPL-MCNC: 15 MG/DL — SIGNIFICANT CHANGE UP (ref 7–23)
CALCIUM SERPL-MCNC: 9.2 MG/DL — SIGNIFICANT CHANGE UP (ref 8.4–10.5)
CHLORIDE SERPL-SCNC: 99 MMOL/L — SIGNIFICANT CHANGE UP (ref 96–108)
CO2 SERPL-SCNC: 32 MMOL/L — HIGH (ref 22–31)
CREAT SERPL-MCNC: 1.04 MG/DL — SIGNIFICANT CHANGE UP (ref 0.5–1.3)
EGFR: 78 ML/MIN/1.73M2 — SIGNIFICANT CHANGE UP
EGFR: 78 ML/MIN/1.73M2 — SIGNIFICANT CHANGE UP
EOSINOPHIL # BLD AUTO: 1.59 K/UL — HIGH (ref 0–0.5)
EOSINOPHIL # BLD MANUAL: 2.12 K/UL — HIGH (ref 0–0.5)
EOSINOPHIL NFR BLD AUTO: 21.8 % — HIGH (ref 0–6)
EOSINOPHIL NFR BLD MANUAL: 29.1 % — HIGH (ref 0–6)
GLUCOSE SERPL-MCNC: 99 MG/DL — SIGNIFICANT CHANGE UP (ref 70–99)
HCT VFR BLD CALC: 38.8 % — LOW (ref 39–50)
HGB BLD-MCNC: 13.1 G/DL — SIGNIFICANT CHANGE UP (ref 13–17)
IMM GRANULOCYTES # BLD AUTO: 0.01 K/UL — SIGNIFICANT CHANGE UP (ref 0–0.07)
IMM GRANULOCYTES NFR BLD AUTO: 0.1 % — SIGNIFICANT CHANGE UP (ref 0–0.9)
INR BLD: 0.99 RATIO — SIGNIFICANT CHANGE UP (ref 0.85–1.16)
LIDOCAIN IGE QN: 75 U/L — SIGNIFICANT CHANGE UP (ref 16–77)
LYMPHOCYTES # BLD AUTO: 1.82 K/UL — SIGNIFICANT CHANGE UP (ref 1–3.3)
LYMPHOCYTES # BLD MANUAL: 1.79 K/UL — SIGNIFICANT CHANGE UP (ref 1–3.3)
LYMPHOCYTES NFR BLD AUTO: 25 % — SIGNIFICANT CHANGE UP (ref 13–44)
LYMPHOCYTES NFR BLD MANUAL: 24.5 % — SIGNIFICANT CHANGE UP (ref 13–44)
MAGNESIUM SERPL-MCNC: 2.2 MG/DL — SIGNIFICANT CHANGE UP (ref 1.6–2.6)
MCHC RBC-ENTMCNC: 29.6 PG — SIGNIFICANT CHANGE UP (ref 27–34)
MCHC RBC-ENTMCNC: 33.8 G/DL — SIGNIFICANT CHANGE UP (ref 32–36)
MCV RBC AUTO: 87.6 FL — SIGNIFICANT CHANGE UP (ref 80–100)
MONOCYTES # BLD AUTO: 0.71 K/UL — SIGNIFICANT CHANGE UP (ref 0–0.9)
MONOCYTES # BLD MANUAL: 0.33 K/UL — SIGNIFICANT CHANGE UP (ref 0–0.9)
MONOCYTES NFR BLD AUTO: 9.7 % — SIGNIFICANT CHANGE UP (ref 2–14)
MONOCYTES NFR BLD MANUAL: 4.5 % — SIGNIFICANT CHANGE UP (ref 2–14)
NEUTROPHILS # BLD AUTO: 3.1 K/UL — SIGNIFICANT CHANGE UP (ref 1.8–7.4)
NEUTROPHILS # BLD MANUAL: 2.99 K/UL — SIGNIFICANT CHANGE UP (ref 1.8–7.4)
NEUTROPHILS NFR BLD AUTO: 42.6 % — LOW (ref 43–77)
NEUTROPHILS NFR BLD MANUAL: 41 % — LOW (ref 43–77)
NRBC # BLD AUTO: 0 K/UL — SIGNIFICANT CHANGE UP (ref 0–0)
NRBC # FLD: 0 K/UL — SIGNIFICANT CHANGE UP (ref 0–0)
NRBC BLD AUTO-RTO: 0 /100 WBCS — SIGNIFICANT CHANGE UP (ref 0–0)
PLAT MORPH BLD: NORMAL — SIGNIFICANT CHANGE UP
PLATELET # BLD AUTO: 204 K/UL — SIGNIFICANT CHANGE UP (ref 150–400)
PLATELET COUNT - ESTIMATE: NORMAL — SIGNIFICANT CHANGE UP
PMV BLD: 10.1 FL — SIGNIFICANT CHANGE UP (ref 7–13)
POTASSIUM SERPL-MCNC: 4 MMOL/L — SIGNIFICANT CHANGE UP (ref 3.5–5.3)
POTASSIUM SERPL-SCNC: 4 MMOL/L — SIGNIFICANT CHANGE UP (ref 3.5–5.3)
PROT SERPL-MCNC: 7.6 G/DL — SIGNIFICANT CHANGE UP (ref 6–8.3)
PROTHROM AB SERPL-ACNC: 11.5 SEC — SIGNIFICANT CHANGE UP (ref 9.9–13.4)
RBC # BLD: 4.43 M/UL — SIGNIFICANT CHANGE UP (ref 4.2–5.8)
RBC # FLD: 13.9 % — SIGNIFICANT CHANGE UP (ref 10.3–14.5)
RBC BLD AUTO: NORMAL — SIGNIFICANT CHANGE UP
SMUDGE CELLS # BLD: PRESENT
SODIUM SERPL-SCNC: 138 MMOL/L — SIGNIFICANT CHANGE UP (ref 135–145)
TROPONIN I, HIGH SENSITIVITY RESULT: <4 NG/L — SIGNIFICANT CHANGE UP
WBC # BLD: 7.29 K/UL — SIGNIFICANT CHANGE UP (ref 3.8–10.5)
WBC # FLD AUTO: 7.29 K/UL — SIGNIFICANT CHANGE UP (ref 3.8–10.5)

## 2025-09-13 PROCEDURE — 83690 ASSAY OF LIPASE: CPT

## 2025-09-13 PROCEDURE — 83735 ASSAY OF MAGNESIUM: CPT

## 2025-09-13 PROCEDURE — 96374 THER/PROPH/DIAG INJ IV PUSH: CPT | Mod: XU

## 2025-09-13 PROCEDURE — 93005 ELECTROCARDIOGRAM TRACING: CPT

## 2025-09-13 PROCEDURE — 36415 COLL VENOUS BLD VENIPUNCTURE: CPT

## 2025-09-13 PROCEDURE — 84484 ASSAY OF TROPONIN QUANT: CPT

## 2025-09-13 PROCEDURE — 74177 CT ABD & PELVIS W/CONTRAST: CPT

## 2025-09-13 PROCEDURE — 85730 THROMBOPLASTIN TIME PARTIAL: CPT

## 2025-09-13 PROCEDURE — 74177 CT ABD & PELVIS W/CONTRAST: CPT | Mod: 26

## 2025-09-13 PROCEDURE — 85025 COMPLETE CBC W/AUTO DIFF WBC: CPT

## 2025-09-13 PROCEDURE — 96375 TX/PRO/DX INJ NEW DRUG ADDON: CPT

## 2025-09-13 PROCEDURE — 99285 EMERGENCY DEPT VISIT HI MDM: CPT

## 2025-09-13 PROCEDURE — 85610 PROTHROMBIN TIME: CPT

## 2025-09-13 PROCEDURE — 71045 X-RAY EXAM CHEST 1 VIEW: CPT

## 2025-09-13 PROCEDURE — 99285 EMERGENCY DEPT VISIT HI MDM: CPT | Mod: 25

## 2025-09-13 PROCEDURE — 80053 COMPREHEN METABOLIC PANEL: CPT

## 2025-09-13 PROCEDURE — 71045 X-RAY EXAM CHEST 1 VIEW: CPT | Mod: 26

## 2025-09-13 RX ORDER — ACETAMINOPHEN 500 MG/5ML
1000 LIQUID (ML) ORAL ONCE
Refills: 0 | Status: COMPLETED | OUTPATIENT
Start: 2025-09-13 | End: 2025-09-13

## 2025-09-13 RX ADMIN — Medication 400 MILLIGRAM(S): at 16:47

## 2025-09-13 RX ADMIN — Medication 20 MILLIGRAM(S): at 16:48

## 2025-09-15 ENCOUNTER — RX RENEWAL (OUTPATIENT)
Age: 70
End: 2025-09-15

## 2025-09-16 ENCOUNTER — APPOINTMENT (OUTPATIENT)
Dept: COLORECTAL SURGERY | Facility: CLINIC | Age: 70
End: 2025-09-16

## (undated) DEVICE — SUT VLOC 180 2-0 12" V-20 GREEN

## (undated) DEVICE — TUBING STRYKER PNEUMOCLEAR SMOKE EVACUATION HIGH FLOW

## (undated) DEVICE — XI OBTURATOR OPTICAL BLADELESS 8MM

## (undated) DEVICE — NDL HYPO SAFE 22G X 1.5" (BLACK)

## (undated) DEVICE — XI TIP COVER

## (undated) DEVICE — XI ARM SCISSOR MONO CURVED

## (undated) DEVICE — PACK GENERAL LAPAROSCOPY

## (undated) DEVICE — XI DRAPE COLUMN

## (undated) DEVICE — DRAPE 3/4 SHEET 52X76"

## (undated) DEVICE — INSUFFLATION NDL ETHICON ENDOPATH VERESS 14G 120MM

## (undated) DEVICE — DRAPE SPLIT SHEET 77" X 120"

## (undated) DEVICE — GLV 7.5 PROTEXIS (BLUE)

## (undated) DEVICE — XI DRAPE ARM

## (undated) DEVICE — XI ARM FORCEP FENESTRATED BIPOLAR 8MM

## (undated) DEVICE — D HELP - CLEARVIEW CLEARIFY SYSTEM

## (undated) DEVICE — SUT MONOCRYL 4-0 27" PS-2 UNDYED

## (undated) DEVICE — SUT VICRYL 0 27" UR-6

## (undated) DEVICE — GLV 7 PROTEXIS (WHITE)

## (undated) DEVICE — BLADE SURGICAL #15 CARBON

## (undated) DEVICE — XI SEAL UNIV 5- 8 MM

## (undated) DEVICE — XI ARM NEEDLE DRIVER LARGE